# Patient Record
Sex: FEMALE | Race: WHITE | NOT HISPANIC OR LATINO | Employment: FULL TIME | URBAN - NONMETROPOLITAN AREA
[De-identification: names, ages, dates, MRNs, and addresses within clinical notes are randomized per-mention and may not be internally consistent; named-entity substitution may affect disease eponyms.]

---

## 2017-07-21 ENCOUNTER — OFFICE VISIT (OUTPATIENT)
Dept: SURGERY | Facility: CLINIC | Age: 57
End: 2017-07-21

## 2017-07-21 VITALS
WEIGHT: 225 LBS | SYSTOLIC BLOOD PRESSURE: 130 MMHG | OXYGEN SATURATION: 98 % | TEMPERATURE: 97.6 F | BODY MASS INDEX: 39.87 KG/M2 | HEART RATE: 86 BPM | DIASTOLIC BLOOD PRESSURE: 78 MMHG | HEIGHT: 63 IN | RESPIRATION RATE: 16 BRPM

## 2017-07-21 DIAGNOSIS — K62.5 RECTAL BLEED: ICD-10-CM

## 2017-07-21 DIAGNOSIS — K60.3 ANAL FISTULA: Primary | ICD-10-CM

## 2017-07-21 PROCEDURE — 99204 OFFICE O/P NEW MOD 45 MIN: CPT | Performed by: SURGERY

## 2017-07-21 RX ORDER — METOPROLOL SUCCINATE 25 MG/1
50 TABLET, EXTENDED RELEASE ORAL DAILY
Refills: 1 | COMMUNITY
Start: 2017-06-29

## 2017-07-21 RX ORDER — MELOXICAM 15 MG/1
15 TABLET ORAL DAILY
Refills: 1 | COMMUNITY
Start: 2017-06-29

## 2017-07-21 RX ORDER — CIPROFLOXACIN 500 MG/1
TABLET, FILM COATED ORAL
Refills: 0 | COMMUNITY
Start: 2017-07-19 | End: 2017-08-14

## 2017-07-21 RX ORDER — SULFAMETHOXAZOLE AND TRIMETHOPRIM 800; 160 MG/1; MG/1
TABLET ORAL
Refills: 0 | COMMUNITY
Start: 2017-07-19 | End: 2017-08-14

## 2017-07-21 RX ORDER — GABAPENTIN 100 MG/1
100 CAPSULE ORAL 3 TIMES DAILY
Refills: 0 | COMMUNITY
Start: 2017-07-17

## 2017-07-21 RX ORDER — LANSOPRAZOLE 30 MG/1
30 CAPSULE, DELAYED RELEASE ORAL 2 TIMES DAILY
Refills: 1 | COMMUNITY
Start: 2017-06-29

## 2017-07-21 RX ORDER — SUMATRIPTAN 100 MG/1
100 TABLET, FILM COATED ORAL DAILY PRN
Refills: 1 | COMMUNITY
Start: 2017-06-29

## 2017-07-21 RX ORDER — DULOXETIN HYDROCHLORIDE 60 MG/1
60 CAPSULE, DELAYED RELEASE ORAL DAILY
Refills: 1 | COMMUNITY
Start: 2017-06-29

## 2017-07-21 NOTE — PROGRESS NOTES
Patient: Angelica Stewart    YOB: 1960    Date: 07/21/2017    Primary Care Provider: Tacho Mendoza MD    Reason for Consultation: abscess    Chief complaint:   Chief Complaint   Patient presents with   • Abscess     buttocks       Subjective .     History of present illness:  Patient presents today with abscess on her right buttocks for 2 months. She states that it constantly changes in size and Sunday night it busted on its own.She was seen by her PCP and a culture was taken due to the area voluntarily draining. She was placed on Cipro and Bactrim that she has at home but has not started. Patient had a family history of anal fistulas and has not had a previous colonoscopy.  She also has intermittent rectal bleeding which is concerning.  No weight loss or anemia.    Review of Systems   Constitutional: Negative for chills, fever and unexpected weight change.   HENT: Negative for hearing loss, trouble swallowing and voice change.    Eyes: Negative for visual disturbance.   Respiratory: Negative for apnea, cough, chest tightness, shortness of breath and wheezing.    Cardiovascular: Negative for chest pain, palpitations and leg swelling.   Gastrointestinal: Negative for abdominal distention, abdominal pain, anal bleeding, blood in stool, constipation, diarrhea, nausea, rectal pain and vomiting.   Endocrine: Negative for cold intolerance and heat intolerance.   Genitourinary: Negative for difficulty urinating, dysuria and flank pain.   Musculoskeletal: Negative for back pain and gait problem.   Skin: Positive for wound. Negative for color change and rash.   Neurological: Negative for dizziness, syncope, speech difficulty, weakness, light-headedness, numbness and headaches.   Hematological: Negative for adenopathy. Does not bruise/bleed easily.   Psychiatric/Behavioral: Negative for confusion. The patient is not nervous/anxious.        Allergies:  Allergies   Allergen Reactions   • Penicillins  "       Medications:    Current Outpatient Prescriptions:   •  DULoxetine (CYMBALTA) 60 MG capsule, , Disp: , Rfl: 1  •  gabapentin (NEURONTIN) 100 MG capsule, , Disp: , Rfl: 0  •  lansoprazole (PREVACID) 30 MG capsule, , Disp: , Rfl: 1  •  linaclotide (LINZESS) 72 MCG capsule capsule, Take 72 mcg by mouth Every Morning Before Breakfast., Disp: , Rfl:   •  meloxicam (MOBIC) 15 MG tablet, , Disp: , Rfl: 1  •  metoprolol succinate XL (TOPROL-XL) 25 MG 24 hr tablet, , Disp: , Rfl: 1  •  SUMAtriptan (IMITREX) 100 MG tablet, , Disp: , Rfl: 1  •  ciprofloxacin (CIPRO) 500 MG tablet, TAKE 1 TABLET BY MOUTH TWICE DAILY, Disp: , Rfl: 0  •  sulfamethoxazole-trimethoprim (BACTRIM DS,SEPTRA DS) 800-160 MG per tablet, TAKE 2 TABLETS BY MOUTH TWICE DAILY FOR 10 DAYS, Disp: , Rfl: 0    History\"  Past Medical History:   Diagnosis Date   • Acid reflux    • Fibromyalgia    • Migraines        Past Surgical History:   Procedure Laterality Date   • TOOTH EXTRACTION         Family History   Problem Relation Age of Onset   • Cancer Father      colon       Social History   Substance Use Topics   • Smoking status: Never Smoker   • Smokeless tobacco: Never Used   • Alcohol use No        Objective     Vital Signs:   /78  Pulse 86  Temp 97.6 °F (36.4 °C) (Temporal Artery )   Resp 16  Ht 63\" (160 cm)  Wt 225 lb (102 kg)  SpO2 98%  BMI 39.86 kg/m2    Physical Exam:   General Appearance:    Alert, cooperative, in no acute distress   Head:    Normocephalic, without obvious abnormality, atraumatic   Eyes:            Lids and lashes normal, conjunctivae and sclerae normal, no   icterus, no pallor, corneas clear, PERRLA   Ears:    Ears appear intact with no abnormalities noted   Throat:   No oral lesions, no thrush, oral mucosa moist   Neck:   No adenopathy, supple, trachea midline, no thyromegaly, no   carotid bruit, no JVD   Lungs:     Clear to auscultation,respirations regular, even and                  unlabored    Heart:    Regular " rhythm and normal rate, normal S1 and S2, no            murmur, no gallop, no rub, no click   Chest Wall:    No abnormalities observed   Abdomen:     Normal bowel sounds, no masses, no organomegaly, soft        non-tender, non-distended, no guarding, no rebound                tenderness   Extremities:   Moves all extremities well, no edema, no cyanosis, no             redness   Pulses:   Pulses palpable and equal bilaterally   Skin:   No bleeding, bruising or rash   Lymph nodes:   No palpable adenopathy   Neurologic:   Cranial nerves 2 - 12 grossly intact, sensation intact, DTR       present and equal bilaterally  Rectal-anal fistula at the 2 o'clock position.       Results Review:   I reviewed the patient's new clinical results.    Assessment/Plan     1. Anal fistula    2. Rectal bleed        Patient scheduled for colonoscopy and excision of anal fistula.  Risk of bleeding, infection and recurrence discussed and patient agreeable.  Also told that wound remained open distally and she is agreeable.    I discussed the patients findings and my recommendations with patient    Electronically signed by Manisha Potter MD  07/21/17      .

## 2017-08-14 ENCOUNTER — OFFICE VISIT (OUTPATIENT)
Dept: SURGERY | Facility: CLINIC | Age: 57
End: 2017-08-14

## 2017-08-14 VITALS
WEIGHT: 225 LBS | HEART RATE: 82 BPM | DIASTOLIC BLOOD PRESSURE: 88 MMHG | TEMPERATURE: 96 F | RESPIRATION RATE: 16 BRPM | SYSTOLIC BLOOD PRESSURE: 132 MMHG | OXYGEN SATURATION: 97 % | HEIGHT: 63 IN | BODY MASS INDEX: 39.87 KG/M2

## 2017-08-14 DIAGNOSIS — K60.3 ANAL FISTULA: ICD-10-CM

## 2017-08-14 DIAGNOSIS — Z12.11 SCREENING FOR COLON CANCER: Primary | ICD-10-CM

## 2017-08-14 PROCEDURE — 99213 OFFICE O/P EST LOW 20 MIN: CPT | Performed by: SURGERY

## 2017-08-14 NOTE — PROGRESS NOTES
"Patient: Angelica Stewart    YOB: 1960    Date: 08/14/2017    Primary Care Provider: Tacho Mendoza MD    Reason for Consultation: anal fistula & rectocele    Chief Complaint:   Chief Complaint   Patient presents with   • Follow-up     anal fistula and rectocele       History: Patient was seen by Dr Ptoter on 07/21/2017 to be scheduled for a colonoscopy and excision anal fistula. She is here today to discuss this procedure that she has not yet had. She is also c/o having a rectocele that she needs checked. She was seen by Dr Pugh for this in the past but has not been back to see him since he moved. Patient feels anal fistula completely healed.  No residual tractors left.  She comes in for examination.    Review of Systems   Constitutional: Negative for chills, fever and unexpected weight change.   HENT: Negative for hearing loss, trouble swallowing and voice change.    Eyes: Negative for visual disturbance.   Respiratory: Negative for apnea, cough, chest tightness, shortness of breath and wheezing.    Cardiovascular: Negative for chest pain, palpitations and leg swelling.   Gastrointestinal: Negative for abdominal distention, abdominal pain, anal bleeding, blood in stool, constipation, diarrhea, nausea, rectal pain and vomiting.   Endocrine: Negative for cold intolerance and heat intolerance.   Genitourinary: Negative for difficulty urinating, dysuria and flank pain.   Musculoskeletal: Negative for back pain and gait problem.   Skin: Negative for color change, rash and wound.   Neurological: Negative for dizziness, syncope, speech difficulty, weakness, light-headedness, numbness and headaches.   Hematological: Negative for adenopathy. Does not bruise/bleed easily.   Psychiatric/Behavioral: Negative for confusion. The patient is not nervous/anxious.        Vital Signs  /88  Pulse 82  Temp 96 °F (35.6 °C) (Temporal Artery )   Resp 16  Ht 63\" (160 cm)  Wt 225 lb (102 kg)  SpO2 " 97%  BMI 39.86 kg/m2    Allergies:  Allergies   Allergen Reactions   • Penicillins        Medications:    Current Outpatient Prescriptions:   •  DULoxetine (CYMBALTA) 60 MG capsule, , Disp: , Rfl: 1  •  gabapentin (NEURONTIN) 100 MG capsule, , Disp: , Rfl: 0  •  lansoprazole (PREVACID) 30 MG capsule, , Disp: , Rfl: 1  •  linaclotide (LINZESS) 72 MCG capsule capsule, Take 72 mcg by mouth Every Morning Before Breakfast., Disp: , Rfl:   •  meloxicam (MOBIC) 15 MG tablet, , Disp: , Rfl: 1  •  metoprolol succinate XL (TOPROL-XL) 25 MG 24 hr tablet, , Disp: , Rfl: 1  •  SUMAtriptan (IMITREX) 100 MG tablet, , Disp: , Rfl: 1    Physical Exam:   General Appearance:    Alert, cooperative, in no acute distress   Head:    Normocephalic, without obvious abnormality, atraumatic   Lungs:     Clear to auscultation,respirations regular, even and                  unlabored    Heart:    Regular rhythm and normal rate, normal S1 and S2, no            murmur, no gallop, no rub, no click   Abdomen:     Normal bowel sounds, no masses, no organomegaly, soft        non-tender, non-distended, no guarding, no rebound                tenderness   Extremities:   Moves all extremities well, no edema, no cyanosis, no             redness   Pulses:   Pulses palpable and equal bilaterally   Skin:   No bleeding, bruising or rash      Assessment/Plan     1. Screening for colon cancer    2. Anal fistula      It appeared the anal fistula has resolved.  Very small residual not is present.  Patient would like to schedule colonoscopy but hold off on excision of anal fistula.  She understands this could really occur and if it does she will be agreeable to excision.  She understands about the colonoscopy and risks of bleeding perforation and is agreeable.    Electronically signed by Manisha Potter MD  08/14/17  Scribed for Manisha Potter MD by Kat Morrow. 8/14/2017  3:52 PM

## 2017-09-25 ENCOUNTER — HOSPITAL ENCOUNTER (OUTPATIENT)
Facility: HOSPITAL | Age: 57
Setting detail: HOSPITAL OUTPATIENT SURGERY
Discharge: HOME | End: 2017-09-25
Attending: SURGERY | Admitting: SURGERY

## 2017-09-25 ENCOUNTER — ANESTHESIA (OUTPATIENT)
Dept: GASTROENTEROLOGY | Facility: HOSPITAL | Age: 57
End: 2017-09-25

## 2017-09-25 ENCOUNTER — ANESTHESIA EVENT (OUTPATIENT)
Dept: GASTROENTEROLOGY | Facility: HOSPITAL | Age: 57
End: 2017-09-25

## 2017-09-25 VITALS
TEMPERATURE: 98.2 F | BODY MASS INDEX: 38.98 KG/M2 | WEIGHT: 220 LBS | DIASTOLIC BLOOD PRESSURE: 90 MMHG | HEART RATE: 78 BPM | OXYGEN SATURATION: 96 % | HEIGHT: 63 IN | RESPIRATION RATE: 18 BRPM | SYSTOLIC BLOOD PRESSURE: 181 MMHG

## 2017-09-25 PROCEDURE — G0121 COLON CA SCRN NOT HI RSK IND: HCPCS | Performed by: SURGERY

## 2017-09-25 PROCEDURE — S0260 H&P FOR SURGERY: HCPCS | Performed by: SURGERY

## 2017-09-25 PROCEDURE — 25010000002 PROPOFOL 200 MG/20ML EMULSION: Performed by: NURSE ANESTHETIST, CERTIFIED REGISTERED

## 2017-09-25 PROCEDURE — 25010000002 ONDANSETRON PER 1 MG: Performed by: NURSE ANESTHETIST, CERTIFIED REGISTERED

## 2017-09-25 RX ORDER — SODIUM CHLORIDE, SODIUM LACTATE, POTASSIUM CHLORIDE, CALCIUM CHLORIDE 600; 310; 30; 20 MG/100ML; MG/100ML; MG/100ML; MG/100ML
1000 INJECTION, SOLUTION INTRAVENOUS CONTINUOUS PRN
Status: DISCONTINUED | OUTPATIENT
Start: 2017-09-25 | End: 2017-09-25 | Stop reason: HOSPADM

## 2017-09-25 RX ORDER — ONDANSETRON 2 MG/ML
4 INJECTION INTRAMUSCULAR; INTRAVENOUS ONCE AS NEEDED
Status: DISCONTINUED | OUTPATIENT
Start: 2017-09-25 | End: 2017-09-25 | Stop reason: HOSPADM

## 2017-09-25 RX ORDER — PROPOFOL 10 MG/ML
INJECTION, EMULSION INTRAVENOUS AS NEEDED
Status: DISCONTINUED | OUTPATIENT
Start: 2017-09-25 | End: 2017-09-25 | Stop reason: SURG

## 2017-09-25 RX ORDER — SODIUM CHLORIDE 0.9 % (FLUSH) 0.9 %
3 SYRINGE (ML) INJECTION AS NEEDED
Status: DISCONTINUED | OUTPATIENT
Start: 2017-09-25 | End: 2017-09-25 | Stop reason: HOSPADM

## 2017-09-25 RX ORDER — ONDANSETRON 2 MG/ML
INJECTION INTRAMUSCULAR; INTRAVENOUS AS NEEDED
Status: DISCONTINUED | OUTPATIENT
Start: 2017-09-25 | End: 2017-09-25 | Stop reason: SURG

## 2017-09-25 RX ADMIN — ONDANSETRON 4 MG: 2 INJECTION INTRAMUSCULAR; INTRAVENOUS at 09:12

## 2017-09-25 RX ADMIN — PROPOFOL 100 MG: 10 INJECTION, EMULSION INTRAVENOUS at 09:17

## 2017-09-25 RX ADMIN — LIDOCAINE HYDROCHLORIDE 60 MG: 20 INJECTION, SOLUTION INTRAVENOUS at 09:12

## 2017-09-25 RX ADMIN — PROPOFOL 50 MG: 10 INJECTION, EMULSION INTRAVENOUS at 09:27

## 2017-09-25 RX ADMIN — SODIUM CHLORIDE, POTASSIUM CHLORIDE, SODIUM LACTATE AND CALCIUM CHLORIDE 1000 ML: 600; 310; 30; 20 INJECTION, SOLUTION INTRAVENOUS at 07:58

## 2017-09-25 RX ADMIN — PROPOFOL 50 MG: 10 INJECTION, EMULSION INTRAVENOUS at 09:19

## 2017-09-25 RX ADMIN — PROPOFOL 50 MG: 10 INJECTION, EMULSION INTRAVENOUS at 09:24

## 2017-09-25 NOTE — DISCHARGE INSTRUCTIONS
Please follow all post op instructions and follow up appointment time from your physician's office included in your discharge packet.  .   No pushing, pulling, tugging,  heavy lifting, or strenuous activity.  No major decision making, driving, or drinking alcoholic beverages for 24 hours. ( due to the medications you have  received)  Always use good hand hygiene/washing techniques.  NO driving while taking pain medications.To assist you in voiding:  Drink plenty of fluids  Listen to running water while attempting to void.    If you are unable to urinate and you have an uncomfortable urge to void or it has been   6 hours since you were discharged, return to the Emergency Room  Rest today  No pushing,pulling,tugging,heavy lifting, or strenuous activity   No major decision making,driving,or drinking alcoholic beverages for 24 hours due to the sedation you received  Always use good hand hygiene/washing technique  No driving on pain medications

## 2017-09-25 NOTE — OP NOTE
PATIENT:    Angelica Stewart    DATE OF SURGERY:  9/25/2017    PHYSICIAN:    Manisha Potter MD ,FACS    REFERRING PHYSICIAN:  Manisha Potter MD    YOB: 1960    PREOPERATIVE DIAGNOSIS:   Screening    POSTOPERATIVE DIAGNOSIS:  Normal colonoscopy    PROCEDURE:  Colonoscopy    HISTORY:   The patient was sent to me as a consultation via Manisha Potter MD for evaluation and treatment of the above-mentioned symptomatology.  The patient is here now today for elective colonoscopy with biopsy.  I did meet with the patient preoperatively who understands the full risks and benefits of the above-mentioned procedure.  We will proceed with this today on an elective basis.      ANESTHESIA:  The patient was monitored both preoperatively and postoperatively in the normal fashion from a cardiovascular standpoint.  Oxygen was delivered at 2 liters per nasal cannula, and oxygen saturations were monitored during the procedure.   The patient remained stable from a cardiovascular standpoint throughout the entire procedure.      OPERATIVE PROCEDURE:  The patient was taken to the endoscopy unit and placed in the supine position and given anesthesia as mentioned above.  The patient was then placed in the left lateral decubitus position and the scope was introduced into the patient’s anus and then into the rectum without difficulty.  The scope was carefully advanced throughout the colon to the ileocecal valve.  All mucosal surfaces were visualized.      Upon careful withdrawal of the scope, there was noted to be no evidence of hemorrhoids, colon polyps or diverticulosis..      A retroflexed view was obtained of the patient’s rectum and this showed no evidence of abnormalities.  Digital rectal examination was performed and revealed good sphincter tone and no obvious masses.     The patient was stable at this point in time and subsequently transferred back to the recovery room in stable condition.    QUALITY OF PREP:   Poor    PLAN:  I want to see the patient back in 10 years for followup colonoscopy

## 2017-09-25 NOTE — ANESTHESIA POSTPROCEDURE EVALUATION
"Patient: Angelica Stewart    Procedure Summary     Date Anesthesia Start Anesthesia Stop Room / Location    09/25/17 0912 0930 [facility] ENDOSCOPY 3 / [facility] ENDOSCOPY       Procedure Diagnosis Surgeon Provider    COLONOSCOPY (N/A ) Anal fistula; Screening for colon cancer  (Anal fistula [K60.3]; Screening for colon cancer [Z12.11]) MD Michael Mir CRNA          Anesthesia Type: MAC  Last vitals BP (!) 181/90 (BP Location: Left arm, Patient Position: Sitting)  Pulse 78  Temp 98.2 °F (36.8 °C) (Temporal Artery )   Resp 18  Ht 63\" (160 cm)  Wt 220 lb (99.8 kg)  LMP  (LMP Unknown) Comment:  FOR 7YRS  SpO2 96%  BMI 38.97 kg/m2    BP       Temp        Pulse       Resp        SpO2          Post Anesthesia Care and Evaluation    Patient location during evaluation: bedside  Patient participation: complete - patient participated  Level of consciousness: awake  Pain score: 0  Pain management: adequate  Airway patency: patent  Anesthetic complications: No anesthetic complications  PONV Status: controlled  Cardiovascular status: acceptable and stable  Respiratory status: acceptable and room air  Hydration status: acceptable      "

## 2017-09-25 NOTE — PLAN OF CARE
Problem: GI Endoscopy (Adult)  Goal: Signs and Symptoms of Listed Potential Problems Will be Absent or Manageable (GI Endoscopy)  Outcome: Ongoing (interventions implemented as appropriate)    09/25/17 4262   GI Endoscopy   Problems Assessed (GI Endoscopy) all   Problems Present (GI Endoscopy) situational response

## 2017-09-25 NOTE — ANESTHESIA PREPROCEDURE EVALUATION
Anesthesia Evaluation     Patient summary reviewed and Nursing notes reviewed   history of anesthetic complications: PONV  NPO Solid Status: > 8 hours  NPO Liquid Status: > 8 hours     Airway   Mallampati: II  Neck ROM: full  no difficulty expected  Dental - normal exam     Pulmonary - negative pulmonary ROS and normal exam    breath sounds clear to auscultation  Cardiovascular - normal exam  Exercise tolerance: good (4-7 METS)    Patient on routine beta blocker and Beta blocker given within 24 hours of surgery  Rhythm: regular  Rate: normal    (+) hypertension well controlled,       Neuro/Psych  (+) headaches,    GI/Hepatic/Renal/Endo    (+) obesity,      Musculoskeletal     (+) myalgias,   Abdominal    Substance History - negative use     OB/GYN negative ob/gyn ROS         Other - negative ROS                                       Anesthesia Plan    ASA 3     MAC     Anesthetic plan and risks discussed with patient.

## 2017-09-25 NOTE — H&P
"    [facility]   HISTORY AND PHYSICAL      Name:  Angelica Stewart   Age:  56 y.o.  Sex:  female  :  1960  MRN:  [id number]   Visit Number:  [id number]  Admission Date:  2017  Date Of Service:  17  Primary Care Physician:  Tacho Mendoza MD    Chief Complaint:     Anal fistula, colon screening    History Of Presenting Illness:      Patient here for colonoscopy, had an abscess and anal fistula has resolved.  No family history of colon cancer no rectal bleeding    Review Of Systems:     General ROS: Patient denies any fevers, chills or loss of consciousness.  No complaints of generalized weakness  Psychological ROS: Denies any hallucinations and delusions.  Ophthalmic ROS: no transient loss of vision.  ENT ROS: Denies sore throat, nasal congestion or ear pain.   Allergy and Immunology ROS: Denies rash or itching.  Hematological and Lymphatic ROS: Denies neck swelling or easy bleeding.  Endocrine ROS: Denies any recent unintentional weight gain or loss.  Breast ROS: Denies any pain or swelling.  Respiratory ROS: Denies cough or shortness of breath.   Cardiovascular ROS: Denies chest pain or palpitations. No history of exertional chest pain.   Gastrointestinal ROS: Denies nausea and vomiting. Denies any abdominal pain. No diarrhea.   Genito-Urinary ROS: Denies dysuria or hematuria.  Musculoskeletal ROS: no back pain. No muscle pain. No calf pain.   Neurological ROS: Denies any focal weakness. No loss of consciousness. Denies any numbness.   Dermatological ROS: Denies any redness or pruritis.     Past Medical History:    Past Medical History:   Diagnosis Date   • Acid reflux    • Anal fistula    • Body piercing     EARS   • Fibromyalgia     PATIENT REPORTS \"IT'S SELF DIAGNOSED\"   • Full dentures     INSTRUCTED NO ADHESIVES DOS   • Hypertension    • Migraines    • Migraines    • PONV (postoperative nausea and vomiting)    • Rectocele    • Wears contact lenses  "       Past Surgical history:    Past Surgical History:   Procedure Laterality Date   • BREAST SURGERY Bilateral     REPORTS BILATERAL BIOPSIES WITH TUMOR MARKER PLACEMENT   • ENDOSCOPY     • MOUTH SURGERY      REPORTS FULL MOUTH EXTRACTION AT AGE 14   • OTHER SURGICAL HISTORY  2007    REPORTS CYSTS REMOVED FROM THROAT       Social History:    Social History     Social History   • Marital status:      Spouse name: N/A   • Number of children: N/A   • Years of education: N/A     Occupational History   • Not on file.     Social History Main Topics   • Smoking status: Never Smoker   • Smokeless tobacco: Never Used   • Alcohol use No   • Drug use: No   • Sexual activity: Defer     Other Topics Concern   • Not on file     Social History Narrative       Family History:    Family History   Problem Relation Age of Onset   • Cancer Father      colon       Allergies:      Penicillins    Home Medications:    Prior to Admission Medications     Prescriptions Last Dose Informant Patient Reported? Taking?    DULoxetine (CYMBALTA) 60 MG capsule 9/24/2017 Self Yes Yes    Take 60 mg by mouth Daily.    gabapentin (NEURONTIN) 100 MG capsule 9/24/2017 Self Yes Yes    Take 100 mg by mouth 2 (Two) Times a Day.    lansoprazole (PREVACID) 30 MG capsule 9/24/2017 Self Yes Yes    Take 30 mg by mouth 2 (Two) Times a Day.    linaclotide (LINZESS) 72 MCG capsule capsule 9/24/2017 Self Yes Yes    Take 72 mcg by mouth Every Night.    meloxicam (MOBIC) 15 MG tablet 9/24/2017 Self Yes Yes    Take 15 mg by mouth Daily.    metoprolol succinate XL (TOPROL-XL) 25 MG 24 hr tablet 9/24/2017 Self Yes Yes    Take 25 mg by mouth Daily.    SUMAtriptan (IMITREX) 100 MG tablet More than a month Self Yes No    Take 100 mg by mouth Daily As Needed for Migraine.             ED Medications:    Medications   sodium chloride 0.9 % flush 3 mL (not administered)   lactated ringers infusion 1,000 mL (1,000 mL Intravenous New Bag 9/25/17 6348)       Vital  Signs:    Temp:  [96.8 °F (36 °C)] 96.8 °F (36 °C)  Heart Rate:  [74] 74  Resp:  [18] 18  BP: (158)/(80) 158/80    Last 3 weights    09/20/17  1136   Weight: 220 lb (99.8 kg)       Body mass index is 38.97 kg/(m^2).    Physical Exam:      General Appearance:  Alert and cooperative, not in any acute distress.   Head:  Atraumatic and normocephalic, without obvious abnormality.   Eyes:          PERRLA, conjunctivae and sclerae normal, no Icterus. No pallor. Extra-occular movements are within normal limits.   Ears:  Ears appear intact with no abnormalities noted.   Throat: No oral lesions, no thrush, oral mucosa moist.   Neck: Supple, trachea midline, no thyromegaly, no carotid bruit.       Respiratory/Lungs:   Breath sounds heard bilaterally equally.  No crackles or wheezing. No Pleural rub or bronchial breathing. Normal respiratory effort.    Cardiovascular/Heart:  Normal S1 and S2, no murmur. No edema   GI/Abdomen:   No masses, no hepatosplenomegaly. Soft, non-tender, non-distended, no hernia                 Musculoskeletal/ Extremities:   Moves all extremities well   Pulses: Pulses palpable and equal bilaterally   Skin: No bleeding, bruising or rash, no induration   Lymph nodes: No palpable adenopathy   Psychiatric : Alert and oriented ×3.  No depression or anxiety            EKG:      None    Labs:    Lab Results (last 24 hours)     ** No results found for the last 24 hours. **          Radiology:    Imaging Results (last 72 hours)     ** No results found for the last 72 hours. **          Assessment:    Colon screening, anal fistula     Plan:     Colonoscopy, risks of bleeding perforation discussed and patient agreeable    Manisha Potter MD  09/25/17  9:16 AM

## 2018-05-09 ENCOUNTER — TRANSCRIBE ORDERS (OUTPATIENT)
Dept: ADMINISTRATIVE | Facility: HOSPITAL | Age: 58
End: 2018-05-09

## 2018-05-09 DIAGNOSIS — Z12.31 VISIT FOR SCREENING MAMMOGRAM: Primary | ICD-10-CM

## 2018-05-23 ENCOUNTER — HOSPITAL ENCOUNTER (OUTPATIENT)
Dept: MAMMOGRAPHY | Facility: HOSPITAL | Age: 58
Discharge: HOME | End: 2018-05-23
Attending: OBSTETRICS & GYNECOLOGY | Admitting: INTERNAL MEDICINE

## 2018-05-23 DIAGNOSIS — Z12.31 VISIT FOR SCREENING MAMMOGRAM: ICD-10-CM

## 2018-05-23 PROCEDURE — 77067 SCR MAMMO BI INCL CAD: CPT

## 2018-05-23 PROCEDURE — 77063 BREAST TOMOSYNTHESIS BI: CPT | Performed by: RADIOLOGY

## 2018-05-23 PROCEDURE — 77063 BREAST TOMOSYNTHESIS BI: CPT

## 2018-05-23 PROCEDURE — 77067 SCR MAMMO BI INCL CAD: CPT | Performed by: RADIOLOGY

## 2019-01-07 ENCOUNTER — OFFICE VISIT (OUTPATIENT)
Dept: ORTHOPEDIC SURGERY | Facility: CLINIC | Age: 59
End: 2019-01-07

## 2019-01-07 ENCOUNTER — APPOINTMENT (OUTPATIENT)
Dept: PREADMISSION TESTING | Facility: HOSPITAL | Age: 59
End: 2019-01-07

## 2019-01-07 VITALS — WEIGHT: 220 LBS | HEIGHT: 63 IN | HEART RATE: 86 BPM | OXYGEN SATURATION: 96 % | BODY MASS INDEX: 38.98 KG/M2

## 2019-01-07 VITALS — BODY MASS INDEX: 38.98 KG/M2 | WEIGHT: 220 LBS | HEIGHT: 63 IN

## 2019-01-07 DIAGNOSIS — M79.7 FIBROMYALGIA: ICD-10-CM

## 2019-01-07 DIAGNOSIS — S82.842A ANKLE FRACTURE, BIMALLEOLAR, CLOSED, LEFT, INITIAL ENCOUNTER: ICD-10-CM

## 2019-01-07 DIAGNOSIS — S82.842A ANKLE FRACTURE, BIMALLEOLAR, CLOSED, LEFT, INITIAL ENCOUNTER: Primary | ICD-10-CM

## 2019-01-07 LAB
ANION GAP SERPL CALCULATED.3IONS-SCNC: 5 MMOL/L (ref 3–11)
BASOPHILS # BLD AUTO: 0.03 10*3/MM3 (ref 0–0.2)
BASOPHILS NFR BLD AUTO: 0.3 % (ref 0–1)
BUN BLD-MCNC: 12 MG/DL (ref 9–23)
BUN/CREAT SERPL: 20.3 (ref 7–25)
CALCIUM SPEC-SCNC: 9.4 MG/DL (ref 8.7–10.4)
CHLORIDE SERPL-SCNC: 105 MMOL/L (ref 99–109)
CO2 SERPL-SCNC: 28 MMOL/L (ref 20–31)
CREAT BLD-MCNC: 0.59 MG/DL (ref 0.6–1.3)
DEPRECATED RDW RBC AUTO: 44.6 FL (ref 37–54)
EOSINOPHIL # BLD AUTO: 0.15 10*3/MM3 (ref 0–0.3)
EOSINOPHIL NFR BLD AUTO: 1.3 % (ref 0–3)
ERYTHROCYTE [DISTWIDTH] IN BLOOD BY AUTOMATED COUNT: 13 % (ref 11.3–14.5)
GFR SERPL CREATININE-BSD FRML MDRD: 104 ML/MIN/1.73
GLUCOSE BLD-MCNC: 81 MG/DL (ref 70–100)
HBA1C MFR BLD: 7 % (ref 4.8–5.6)
HCT VFR BLD AUTO: 43.1 % (ref 34.5–44)
HGB BLD-MCNC: 13.6 G/DL (ref 11.5–15.5)
IMM GRANULOCYTES # BLD AUTO: 0.05 10*3/MM3 (ref 0–0.03)
IMM GRANULOCYTES NFR BLD AUTO: 0.4 % (ref 0–0.6)
LYMPHOCYTES # BLD AUTO: 2.95 10*3/MM3 (ref 0.6–4.8)
LYMPHOCYTES NFR BLD AUTO: 26.2 % (ref 24–44)
MCH RBC QN AUTO: 29.7 PG (ref 27–31)
MCHC RBC AUTO-ENTMCNC: 31.6 G/DL (ref 32–36)
MCV RBC AUTO: 94.1 FL (ref 80–99)
MONOCYTES # BLD AUTO: 0.84 10*3/MM3 (ref 0–1)
MONOCYTES NFR BLD AUTO: 7.5 % (ref 0–12)
NEUTROPHILS # BLD AUTO: 7.28 10*3/MM3 (ref 1.5–8.3)
NEUTROPHILS NFR BLD AUTO: 64.7 % (ref 41–71)
PLATELET # BLD AUTO: 316 10*3/MM3 (ref 150–450)
PMV BLD AUTO: 11.5 FL (ref 6–12)
POTASSIUM BLD-SCNC: 4.5 MMOL/L (ref 3.5–5.5)
RBC # BLD AUTO: 4.58 10*6/MM3 (ref 3.89–5.14)
SODIUM BLD-SCNC: 138 MMOL/L (ref 132–146)
WBC NRBC COR # BLD: 11.25 10*3/MM3 (ref 3.5–10.8)

## 2019-01-07 PROCEDURE — 83036 HEMOGLOBIN GLYCOSYLATED A1C: CPT | Performed by: ORTHOPAEDIC SURGERY

## 2019-01-07 PROCEDURE — 85025 COMPLETE CBC W/AUTO DIFF WBC: CPT | Performed by: ORTHOPAEDIC SURGERY

## 2019-01-07 PROCEDURE — 93005 ELECTROCARDIOGRAM TRACING: CPT

## 2019-01-07 PROCEDURE — 80048 BASIC METABOLIC PNL TOTAL CA: CPT | Performed by: ORTHOPAEDIC SURGERY

## 2019-01-07 PROCEDURE — 93010 ELECTROCARDIOGRAM REPORT: CPT | Performed by: INTERNAL MEDICINE

## 2019-01-07 PROCEDURE — 36415 COLL VENOUS BLD VENIPUNCTURE: CPT

## 2019-01-07 PROCEDURE — 29405 APPL SHORT LEG CAST: CPT | Performed by: ORTHOPAEDIC SURGERY

## 2019-01-07 PROCEDURE — 99204 OFFICE O/P NEW MOD 45 MIN: CPT | Performed by: ORTHOPAEDIC SURGERY

## 2019-01-07 RX ORDER — IBUPROFEN 800 MG/1
800 TABLET ORAL EVERY 8 HOURS PRN
COMMUNITY
End: 2019-01-16 | Stop reason: HOSPADM

## 2019-01-07 RX ORDER — OXYCODONE HYDROCHLORIDE AND ACETAMINOPHEN 5; 325 MG/1; MG/1
1 TABLET ORAL EVERY 4 HOURS PRN
COMMUNITY
End: 2019-04-10

## 2019-01-07 NOTE — PROGRESS NOTES
"NEW PATIENT    Patient: Angelica Stewart  : 1960    Primary Care Provider: Tacho Mendoza MD    Requesting Provider: As above    Pain and Edema of the Left Ankle      History    Chief Complaint: left ankle injury     History of Present Illness: this is an extremely pleasant 58year old woman here with her .  She fell getting out of a car on Saturday (19) and has a displaced left bimalleolar ankle fracture.  She was seen at an outside ER and splinted.  Dr Mendoza has sent her for evaluation.  She has X-rays on a disc.  She has been trying to elevate, but not always above her heart.  Medical history is significant for fibromyalgia.      Current Outpatient Medications on File Prior to Visit   Medication Sig Dispense Refill   • DULoxetine (CYMBALTA) 60 MG capsule Take 60 mg by mouth Daily.  1   • gabapentin (NEURONTIN) 100 MG capsule Take 100 mg by mouth 2 (Two) Times a Day.  0   • lansoprazole (PREVACID) 30 MG capsule Take 30 mg by mouth 2 (Two) Times a Day.  1   • linaclotide (LINZESS) 72 MCG capsule capsule Take 72 mcg by mouth Every Night.     • meloxicam (MOBIC) 15 MG tablet Take 15 mg by mouth Daily.  1   • metoprolol succinate XL (TOPROL-XL) 25 MG 24 hr tablet Take 25 mg by mouth Daily.  1   • SUMAtriptan (IMITREX) 100 MG tablet Take 100 mg by mouth Daily As Needed for Migraine.  1     No current facility-administered medications on file prior to visit.       Allergies   Allergen Reactions   • Penicillins Other (See Comments)     UNSURE REACTION, REPORTS WAS TOLD THIS WAS AN ALLERGY BY HER MOTHER.   • Celecoxib Rash      Past Medical History:   Diagnosis Date   • Acid reflux    • Anal fistula    • Body piercing     EARS   • Fibromyalgia     PATIENT REPORTS \"IT'S SELF DIAGNOSED\"   • Full dentures     INSTRUCTED NO ADHESIVES DOS   • Hypertension    • Migraines    • Migraines    • PONV (postoperative nausea and vomiting)    • Rectocele    • Wears contact lenses      Past Surgical " History:   Procedure Laterality Date   • BREAST BIOPSY     • BREAST SURGERY Bilateral     REPORTS BILATERAL BIOPSIES WITH TUMOR MARKER PLACEMENT   • COLONOSCOPY N/A 9/25/2017    Procedure: COLONOSCOPY;  Surgeon: Manisha Potter MD;  Location: [facility];  Service:    • ENDOSCOPY     • MOUTH SURGERY      REPORTS FULL MOUTH EXTRACTION AT AGE 14   • OTHER SURGICAL HISTORY  2007    REPORTS CYSTS REMOVED FROM THROAT     Family History   Problem Relation Age of Onset   • Breast cancer Mother 80   • Cancer Mother    • Collagen disease Mother    • Hypertension Mother    • Cancer Father         colon   • Heart attack Father    • Ovarian cancer Neg Hx       Social History     Socioeconomic History   • Marital status:      Spouse name: Not on file   • Number of children: Not on file   • Years of education: Not on file   • Highest education level: Not on file   Social Needs   • Financial resource strain: Not on file   • Food insecurity - worry: Not on file   • Food insecurity - inability: Not on file   • Transportation needs - medical: Not on file   • Transportation needs - non-medical: Not on file   Occupational History   • Not on file   Tobacco Use   • Smoking status: Never Smoker   • Smokeless tobacco: Never Used   Substance and Sexual Activity   • Alcohol use: No   • Drug use: No   • Sexual activity: Defer   Other Topics Concern   • Not on file   Social History Narrative   • Not on file        Review of Systems   Constitutional: Positive for activity change.   HENT: Negative.    Eyes: Negative.    Respiratory: Negative.    Cardiovascular: Negative.    Gastrointestinal: Negative.    Endocrine: Negative.    Genitourinary: Negative.    Musculoskeletal: Positive for arthralgias, joint swelling and myalgias.   Skin: Negative.    Allergic/Immunologic: Negative.    Neurological: Negative.    Hematological: Negative.    Psychiatric/Behavioral: Negative.        The following portions of the patient's history were  "reviewed and updated as appropriate: allergies, current medications, past family history, past medical history, past social history, past surgical history and problem list.    Physical Exam:   Pulse 86   Ht 160 cm (63\")   Wt 99.8 kg (220 lb)   LMP  (LMP Unknown) Comment:  FOR 7YRS  SpO2 96%   BMI 38.97 kg/m²   GENERAL: Body habitus: obese    Lower extremity edema: Right: trace; Leftt: 2+ pitting    Varicose veins:  Right: mild; Left: mild    Gait: in wheelchair     Mental Status:  awake and alert; oriented to person, place, and time    Voice:  clear  SKIN:  Normal and warm and dry    Hair Growth:  Right:normal; Left:  normal  NAILS: Toenails: normal  HEENT: Head: Normocephalic, atraumatic,  without obvious abnormality.  eye: normal external eye, no icterus  ears: normal external ears  nose: normal external nose  pharynx: dental hygiene adequate  PULM:  Repiratory effort normal  CV:  Dorsalis Pedis:  Right: 2+; Left:2+    Posterior Tibial: Right:2+; Left:2+    Capillary Refill:  Brisk  MSK:  Hand:right handed and moderate arthritis      Tibia:  Right:  non tender; Left:  non tender      Ankle:  Right: non tender, ROM  normal and motor function  normal; Left:  tender medial and lateral, significant swelling, skin intact, toes wiggle, not tender in midfoot, not tender in forefoot, no sign of compartment syndrome, all motors fire, sensation intact dorsal and plantar foot, not tender proximal fibula      Foot:  Right:  non tender; Left:  non tender      NEURO: Heel Walking:  Right:  unable to test; Left:  unable to test    Toe Walking:  Right:  unable to test; Left:  unable to test     Luray-Zandra 5.07 monofilament test: normal    Lower extremity sensation: intact     Reflexes:  Biceps:  Right:  not tested; Left:  not tested           Quads:  Right:  not tested; Left:  not tested           Ankle:  Right:  not tested; Left:  not tested      Calf Atrophy:none    Motor Function: all 5/5- some give-way weakness " left due to pain         Medical Decision Making    Data Review:   reviewed radiology images, reviewed radiology results and reviewed outside records    Assessment and Plan/ Diagnosis/Treatment options:   1. Ankle fracture, bimalleolar, closed, left, initial encounter  She has an unstable ankle fracture pattern.  I explained fractures of joints to the patient.  I explained that all joint fractures will develop some arthritis. The goal of treatment is to put the joint back in  place as anatomically as possible, and hold it there to heal. This helps to slow down the progression of post-traumatic arthritis.  We cannot eliminate it.  I explained that fractures have stable and unstable patterns.  I think this fracture is too unstable to hold in a cast, I think the risk of malunion and non union are too high if we use cast treatment.  (it would need to be a long leg cast)  I would recommend surgery.   I explained the surgery, the 23 hour admission.  I explained the incisions, the plate and screws.  I would assume the hardware is permanent, rarely it is bothersome and can be removed.  I explained the possibility of syndesmotic fixation , and the possible need to remove that (I will not know if this is necessary until we are in surgery).  If we do not need to fix the syndesmosis the post op regimen is 6 -8 weeks non wt bearing in a short leg cast then 6-8 weeks walking in a boot doing physical therapy.  If we do need to fix the syndesmosis it is 12 weeks non-wt bearing.    I explained how all ankle fractures swell for months to years, some permanently.   I explained the risks including but not limited todeath, infection, stroke, heart attacks, blood clots, neurovascular damage, stiffness, pain, arthritis, hardware failure, non union, malunion, amputation, etc.  Questions were asked and answered in detail.  She needs to elevate more over her heart.  She was placed in a better fiberglass short leg splint, non-wt bearing.   Plan for surgery 1/15/19 to allow swelling to decrease    - Case Request; Standing  - ceFAZolin (ANCEF) 2 g in Sodium chloride 0.9 % 100 mL IVPB; Infuse 2 g into a venous catheter 1 (One) Time.  - Basic metabolic panel; Future  - CBC and Differential; Future  - Hemoglobin A1c; Future  - ECG 12 Lead; Future  - Case Request    2. Fibromyalgia  She will continue to take gabapentin

## 2019-01-07 NOTE — DISCHARGE INSTRUCTIONS

## 2019-01-14 RX ORDER — ONDANSETRON 4 MG/1
4 TABLET, FILM COATED ORAL EVERY 6 HOURS PRN
Qty: 30 TABLET | Refills: 0 | Status: SHIPPED | OUTPATIENT
Start: 2019-01-14

## 2019-01-14 RX ORDER — HYDROCODONE BITARTRATE AND ACETAMINOPHEN 7.5; 325 MG/1; MG/1
1-2 TABLET ORAL EVERY 6 HOURS PRN
Qty: 60 TABLET | Refills: 0 | Status: SHIPPED | OUTPATIENT
Start: 2019-01-14 | End: 2019-04-10

## 2019-01-14 RX ORDER — OXYCODONE HYDROCHLORIDE AND ACETAMINOPHEN 5; 325 MG/1; MG/1
1-2 TABLET ORAL EVERY 6 HOURS PRN
Qty: 60 TABLET | Refills: 0 | Status: SHIPPED | OUTPATIENT
Start: 2019-01-14 | End: 2019-01-16 | Stop reason: HOSPADM

## 2019-01-15 ENCOUNTER — ANESTHESIA (OUTPATIENT)
Dept: PERIOP | Facility: HOSPITAL | Age: 59
End: 2019-01-15

## 2019-01-15 ENCOUNTER — APPOINTMENT (OUTPATIENT)
Dept: GENERAL RADIOLOGY | Facility: HOSPITAL | Age: 59
End: 2019-01-15
Attending: ORTHOPAEDIC SURGERY

## 2019-01-15 ENCOUNTER — HOSPITAL ENCOUNTER (OUTPATIENT)
Facility: HOSPITAL | Age: 59
Discharge: HOME | End: 2019-01-16
Attending: ORTHOPAEDIC SURGERY | Admitting: ORTHOPAEDIC SURGERY

## 2019-01-15 ENCOUNTER — ANESTHESIA EVENT (OUTPATIENT)
Dept: PERIOP | Facility: HOSPITAL | Age: 59
End: 2019-01-15

## 2019-01-15 DIAGNOSIS — S82.842A ANKLE FRACTURE, BIMALLEOLAR, CLOSED, LEFT, INITIAL ENCOUNTER: ICD-10-CM

## 2019-01-15 DIAGNOSIS — Z74.09 IMPAIRED FUNCTIONAL MOBILITY, BALANCE, GAIT, AND ENDURANCE: Primary | ICD-10-CM

## 2019-01-15 DIAGNOSIS — Z87.81 S/P ORIF (OPEN REDUCTION INTERNAL FIXATION) FRACTURE: ICD-10-CM

## 2019-01-15 DIAGNOSIS — Z98.890 S/P ORIF (OPEN REDUCTION INTERNAL FIXATION) FRACTURE: ICD-10-CM

## 2019-01-15 PROBLEM — E11.9 DM (DIABETES MELLITUS) (HCC): Status: ACTIVE | Noted: 2019-01-15

## 2019-01-15 PROBLEM — I10 HTN (HYPERTENSION): Status: ACTIVE | Noted: 2019-01-15

## 2019-01-15 LAB
GLUCOSE BLDC GLUCOMTR-MCNC: 101 MG/DL (ref 70–130)
GLUCOSE BLDC GLUCOMTR-MCNC: 134 MG/DL (ref 70–130)
GLUCOSE BLDC GLUCOMTR-MCNC: 166 MG/DL (ref 70–130)
GLUCOSE BLDC GLUCOMTR-MCNC: 207 MG/DL (ref 70–130)
POTASSIUM BLD-SCNC: 4.1 MMOL/L (ref 3.5–5.5)

## 2019-01-15 PROCEDURE — C1713 ANCHOR/SCREW BN/BN,TIS/BN: HCPCS | Performed by: ORTHOPAEDIC SURGERY

## 2019-01-15 PROCEDURE — 27829 TREAT LOWER LEG JOINT: CPT | Performed by: ORTHOPAEDIC SURGERY

## 2019-01-15 PROCEDURE — 25010000002 PROPOFOL 10 MG/ML EMULSION: Performed by: NURSE ANESTHETIST, CERTIFIED REGISTERED

## 2019-01-15 PROCEDURE — 25010000002 ROPIVACAINE PER 1 MG: Performed by: NURSE ANESTHETIST, CERTIFIED REGISTERED

## 2019-01-15 PROCEDURE — 25010000002 DEXAMETHASONE PER 1 MG: Performed by: NURSE ANESTHETIST, CERTIFIED REGISTERED

## 2019-01-15 PROCEDURE — 63710000001 INSULIN LISPRO (HUMAN) PER 5 UNITS: Performed by: INTERNAL MEDICINE

## 2019-01-15 PROCEDURE — 97161 PT EVAL LOW COMPLEX 20 MIN: CPT

## 2019-01-15 PROCEDURE — 27814 TREATMENT OF ANKLE FRACTURE: CPT | Performed by: ORTHOPAEDIC SURGERY

## 2019-01-15 PROCEDURE — 82962 GLUCOSE BLOOD TEST: CPT

## 2019-01-15 PROCEDURE — 25010000002 DEXAMETHASONE SODIUM PHOSPHATE 10 MG/ML SOLUTION: Performed by: NURSE ANESTHETIST, CERTIFIED REGISTERED

## 2019-01-15 PROCEDURE — C1769 GUIDE WIRE: HCPCS | Performed by: ORTHOPAEDIC SURGERY

## 2019-01-15 PROCEDURE — 97530 THERAPEUTIC ACTIVITIES: CPT

## 2019-01-15 PROCEDURE — 25010000003 CEFAZOLIN IN DEXTROSE 2-4 GM/100ML-% SOLUTION: Performed by: ORTHOPAEDIC SURGERY

## 2019-01-15 PROCEDURE — 84132 ASSAY OF SERUM POTASSIUM: CPT | Performed by: ORTHOPAEDIC SURGERY

## 2019-01-15 PROCEDURE — 25010000002 ONDANSETRON PER 1 MG: Performed by: NURSE ANESTHETIST, CERTIFIED REGISTERED

## 2019-01-15 PROCEDURE — 76000 FLUOROSCOPY <1 HR PHYS/QHP: CPT

## 2019-01-15 PROCEDURE — 25010000002 FENTANYL CITRATE (PF) 100 MCG/2ML SOLUTION: Performed by: NURSE ANESTHETIST, CERTIFIED REGISTERED

## 2019-01-15 PROCEDURE — 25010000002 MIDAZOLAM PER 1 MG: Performed by: NURSE ANESTHETIST, CERTIFIED REGISTERED

## 2019-01-15 PROCEDURE — 25010000002 BUPRENORPHINE PER 0.1 MG: Performed by: NURSE ANESTHETIST, CERTIFIED REGISTERED

## 2019-01-15 DEVICE — SCRW CORT S/TAP 3.5X14MM: Type: IMPLANTABLE DEVICE | Site: ANKLE | Status: FUNCTIONAL

## 2019-01-15 DEVICE — SCRW CANC FUL/THRD 4.0X14MM: Type: IMPLANTABLE DEVICE | Site: ANKLE | Status: FUNCTIONAL

## 2019-01-15 DEVICE — SCRW CANC FUL/THRD 4.0X16MM: Type: IMPLANTABLE DEVICE | Site: ANKLE | Status: FUNCTIONAL

## 2019-01-15 DEVICE — SCRW CORT S/TAP 3.5X46MM: Type: IMPLANTABLE DEVICE | Site: ANKLE | Status: FUNCTIONAL

## 2019-01-15 DEVICE — PLT TBG 1/3 LCP W COL 7HL 81MM: Type: IMPLANTABLE DEVICE | Site: ANKLE | Status: FUNCTIONAL

## 2019-01-15 DEVICE — SCRW CORT S/TAP 3.5X18MM: Type: IMPLANTABLE DEVICE | Site: ANKLE | Status: FUNCTIONAL

## 2019-01-15 DEVICE — SCRW CORT S/TAP 2.7X30MM: Type: IMPLANTABLE DEVICE | Site: ANKLE | Status: FUNCTIONAL

## 2019-01-15 RX ORDER — SODIUM CHLORIDE, SODIUM LACTATE, POTASSIUM CHLORIDE, CALCIUM CHLORIDE 600; 310; 30; 20 MG/100ML; MG/100ML; MG/100ML; MG/100ML
INJECTION, SOLUTION INTRAVENOUS CONTINUOUS PRN
Status: DISCONTINUED | OUTPATIENT
Start: 2019-01-15 | End: 2019-01-15 | Stop reason: SURG

## 2019-01-15 RX ORDER — HYDROCODONE BITARTRATE AND ACETAMINOPHEN 7.5; 325 MG/1; MG/1
1 TABLET ORAL EVERY 4 HOURS PRN
Status: DISCONTINUED | OUTPATIENT
Start: 2019-01-15 | End: 2019-01-16 | Stop reason: HOSPADM

## 2019-01-15 RX ORDER — BISACODYL 10 MG
10 SUPPOSITORY, RECTAL RECTAL DAILY PRN
Status: DISCONTINUED | OUTPATIENT
Start: 2019-01-15 | End: 2019-01-16 | Stop reason: HOSPADM

## 2019-01-15 RX ORDER — NALOXONE HCL 0.4 MG/ML
0.4 VIAL (ML) INJECTION
Status: DISCONTINUED | OUTPATIENT
Start: 2019-01-15 | End: 2019-01-16 | Stop reason: HOSPADM

## 2019-01-15 RX ORDER — DEXAMETHASONE SODIUM PHOSPHATE 10 MG/ML
INJECTION, SOLUTION INTRAMUSCULAR; INTRAVENOUS
Status: COMPLETED | OUTPATIENT
Start: 2019-01-15 | End: 2019-01-15

## 2019-01-15 RX ORDER — PROPOFOL 10 MG/ML
VIAL (ML) INTRAVENOUS AS NEEDED
Status: DISCONTINUED | OUTPATIENT
Start: 2019-01-15 | End: 2019-01-15 | Stop reason: SURG

## 2019-01-15 RX ORDER — SODIUM CHLORIDE AND POTASSIUM CHLORIDE 150; 450 MG/100ML; MG/100ML
75 INJECTION, SOLUTION INTRAVENOUS CONTINUOUS
Status: DISCONTINUED | OUTPATIENT
Start: 2019-01-15 | End: 2019-01-16 | Stop reason: HOSPADM

## 2019-01-15 RX ORDER — PANTOPRAZOLE SODIUM 40 MG/1
40 TABLET, DELAYED RELEASE ORAL 2 TIMES DAILY
Status: DISCONTINUED | OUTPATIENT
Start: 2019-01-15 | End: 2019-01-16 | Stop reason: HOSPADM

## 2019-01-15 RX ORDER — FENTANYL CITRATE 50 UG/ML
50 INJECTION, SOLUTION INTRAMUSCULAR; INTRAVENOUS
Status: DISCONTINUED | OUTPATIENT
Start: 2019-01-15 | End: 2019-01-15 | Stop reason: HOSPADM

## 2019-01-15 RX ORDER — SUMATRIPTAN 50 MG/1
100 TABLET, FILM COATED ORAL DAILY PRN
Status: DISCONTINUED | OUTPATIENT
Start: 2019-01-15 | End: 2019-01-16 | Stop reason: HOSPADM

## 2019-01-15 RX ORDER — OXYCODONE HYDROCHLORIDE AND ACETAMINOPHEN 5; 325 MG/1; MG/1
1 TABLET ORAL EVERY 4 HOURS PRN
Status: DISCONTINUED | OUTPATIENT
Start: 2019-01-15 | End: 2019-01-16 | Stop reason: HOSPADM

## 2019-01-15 RX ORDER — DEXTROSE MONOHYDRATE 25 G/50ML
25 INJECTION, SOLUTION INTRAVENOUS
Status: DISCONTINUED | OUTPATIENT
Start: 2019-01-15 | End: 2019-01-16 | Stop reason: HOSPADM

## 2019-01-15 RX ORDER — FENTANYL CITRATE 50 UG/ML
INJECTION, SOLUTION INTRAMUSCULAR; INTRAVENOUS
Status: COMPLETED | OUTPATIENT
Start: 2019-01-15 | End: 2019-01-15

## 2019-01-15 RX ORDER — NICOTINE POLACRILEX 4 MG
15 LOZENGE BUCCAL
Status: DISCONTINUED | OUTPATIENT
Start: 2019-01-15 | End: 2019-01-16 | Stop reason: HOSPADM

## 2019-01-15 RX ORDER — FAMOTIDINE 10 MG/ML
20 INJECTION, SOLUTION INTRAVENOUS ONCE
Status: CANCELLED | OUTPATIENT
Start: 2019-01-15 | End: 2019-01-15

## 2019-01-15 RX ORDER — DIPHENOXYLATE HYDROCHLORIDE AND ATROPINE SULFATE 2.5; .025 MG/1; MG/1
1 TABLET ORAL DAILY
Status: DISCONTINUED | OUTPATIENT
Start: 2019-01-15 | End: 2019-01-16 | Stop reason: HOSPADM

## 2019-01-15 RX ORDER — HYDROCODONE BITARTRATE AND ACETAMINOPHEN 7.5; 325 MG/1; MG/1
2 TABLET ORAL EVERY 4 HOURS PRN
Status: DISCONTINUED | OUTPATIENT
Start: 2019-01-15 | End: 2019-01-15

## 2019-01-15 RX ORDER — DEXAMETHASONE SODIUM PHOSPHATE 4 MG/ML
INJECTION, SOLUTION INTRA-ARTICULAR; INTRALESIONAL; INTRAMUSCULAR; INTRAVENOUS; SOFT TISSUE AS NEEDED
Status: DISCONTINUED | OUTPATIENT
Start: 2019-01-15 | End: 2019-01-15 | Stop reason: SURG

## 2019-01-15 RX ORDER — BUPIVACAINE HYDROCHLORIDE 2.5 MG/ML
INJECTION, SOLUTION EPIDURAL; INFILTRATION; INTRACAUDAL
Status: COMPLETED | OUTPATIENT
Start: 2019-01-15 | End: 2019-01-15

## 2019-01-15 RX ORDER — MUPIROCIN CALCIUM 20 MG/G
CREAM TOPICAL AS NEEDED
Status: DISCONTINUED | OUTPATIENT
Start: 2019-01-15 | End: 2019-01-15 | Stop reason: HOSPADM

## 2019-01-15 RX ORDER — GLYCOPYRROLATE 0.2 MG/ML
INJECTION INTRAMUSCULAR; INTRAVENOUS AS NEEDED
Status: DISCONTINUED | OUTPATIENT
Start: 2019-01-15 | End: 2019-01-15 | Stop reason: SURG

## 2019-01-15 RX ORDER — PANTOPRAZOLE SODIUM 40 MG/1
40 TABLET, DELAYED RELEASE ORAL
Status: DISCONTINUED | OUTPATIENT
Start: 2019-01-15 | End: 2019-01-15

## 2019-01-15 RX ORDER — SODIUM CHLORIDE, SODIUM LACTATE, POTASSIUM CHLORIDE, CALCIUM CHLORIDE 600; 310; 30; 20 MG/100ML; MG/100ML; MG/100ML; MG/100ML
9 INJECTION, SOLUTION INTRAVENOUS CONTINUOUS
Status: DISCONTINUED | OUTPATIENT
Start: 2019-01-15 | End: 2019-01-16 | Stop reason: HOSPADM

## 2019-01-15 RX ORDER — ONDANSETRON 2 MG/ML
INJECTION INTRAMUSCULAR; INTRAVENOUS AS NEEDED
Status: DISCONTINUED | OUTPATIENT
Start: 2019-01-15 | End: 2019-01-15 | Stop reason: SURG

## 2019-01-15 RX ORDER — CEFAZOLIN SODIUM 2 G/100ML
2 INJECTION, SOLUTION INTRAVENOUS EVERY 8 HOURS
Status: COMPLETED | OUTPATIENT
Start: 2019-01-15 | End: 2019-01-16

## 2019-01-15 RX ORDER — MIDAZOLAM HYDROCHLORIDE 1 MG/ML
INJECTION INTRAMUSCULAR; INTRAVENOUS
Status: COMPLETED | OUTPATIENT
Start: 2019-01-15 | End: 2019-01-15

## 2019-01-15 RX ORDER — OXYCODONE HYDROCHLORIDE AND ACETAMINOPHEN 5; 325 MG/1; MG/1
2 TABLET ORAL EVERY 4 HOURS PRN
Status: DISCONTINUED | OUTPATIENT
Start: 2019-01-15 | End: 2019-01-16 | Stop reason: HOSPADM

## 2019-01-15 RX ORDER — BUPRENORPHINE HYDROCHLORIDE 0.32 MG/ML
INJECTION INTRAMUSCULAR; INTRAVENOUS
Status: COMPLETED | OUTPATIENT
Start: 2019-01-15 | End: 2019-01-15

## 2019-01-15 RX ORDER — ONDANSETRON 2 MG/ML
4 INJECTION INTRAMUSCULAR; INTRAVENOUS EVERY 6 HOURS PRN
Status: DISCONTINUED | OUTPATIENT
Start: 2019-01-15 | End: 2019-01-16 | Stop reason: HOSPADM

## 2019-01-15 RX ORDER — METOPROLOL SUCCINATE 50 MG/1
50 TABLET, EXTENDED RELEASE ORAL DAILY
Status: DISCONTINUED | OUTPATIENT
Start: 2019-01-15 | End: 2019-01-16 | Stop reason: HOSPADM

## 2019-01-15 RX ORDER — PROPOFOL 10 MG/ML
VIAL (ML) INTRAVENOUS CONTINUOUS PRN
Status: DISCONTINUED | OUTPATIENT
Start: 2019-01-15 | End: 2019-01-15 | Stop reason: SURG

## 2019-01-15 RX ORDER — SODIUM CHLORIDE 0.9 % (FLUSH) 0.9 %
3-10 SYRINGE (ML) INJECTION AS NEEDED
Status: DISCONTINUED | OUTPATIENT
Start: 2019-01-15 | End: 2019-01-15 | Stop reason: HOSPADM

## 2019-01-15 RX ORDER — FAMOTIDINE 20 MG/1
20 TABLET, FILM COATED ORAL ONCE
Status: COMPLETED | OUTPATIENT
Start: 2019-01-15 | End: 2019-01-15

## 2019-01-15 RX ORDER — LIDOCAINE HYDROCHLORIDE 10 MG/ML
INJECTION, SOLUTION INFILTRATION; PERINEURAL AS NEEDED
Status: DISCONTINUED | OUTPATIENT
Start: 2019-01-15 | End: 2019-01-15 | Stop reason: SURG

## 2019-01-15 RX ORDER — PROMETHAZINE HYDROCHLORIDE 25 MG/ML
12.5 INJECTION, SOLUTION INTRAMUSCULAR; INTRAVENOUS EVERY 4 HOURS PRN
Status: DISCONTINUED | OUTPATIENT
Start: 2019-01-15 | End: 2019-01-16 | Stop reason: HOSPADM

## 2019-01-15 RX ORDER — ACETAMINOPHEN 325 MG/1
650 TABLET ORAL EVERY 6 HOURS PRN
Status: DISCONTINUED | OUTPATIENT
Start: 2019-01-15 | End: 2019-01-16 | Stop reason: HOSPADM

## 2019-01-15 RX ORDER — DULOXETIN HYDROCHLORIDE 60 MG/1
60 CAPSULE, DELAYED RELEASE ORAL NIGHTLY
Status: DISCONTINUED | OUTPATIENT
Start: 2019-01-15 | End: 2019-01-16 | Stop reason: HOSPADM

## 2019-01-15 RX ORDER — ONDANSETRON 4 MG/1
4 TABLET, FILM COATED ORAL EVERY 6 HOURS PRN
Status: DISCONTINUED | OUTPATIENT
Start: 2019-01-15 | End: 2019-01-16 | Stop reason: HOSPADM

## 2019-01-15 RX ORDER — ONDANSETRON 2 MG/ML
4 INJECTION INTRAMUSCULAR; INTRAVENOUS ONCE AS NEEDED
Status: DISCONTINUED | OUTPATIENT
Start: 2019-01-15 | End: 2019-01-15 | Stop reason: HOSPADM

## 2019-01-15 RX ORDER — CEFAZOLIN SODIUM 2 G/100ML
2 INJECTION, SOLUTION INTRAVENOUS ONCE
Status: COMPLETED | OUTPATIENT
Start: 2019-01-15 | End: 2019-01-15

## 2019-01-15 RX ORDER — NEOSTIGMINE METHYLSULFATE 5 MG/5 ML
SYRINGE (ML) INTRAVENOUS AS NEEDED
Status: DISCONTINUED | OUTPATIENT
Start: 2019-01-15 | End: 2019-01-15 | Stop reason: SURG

## 2019-01-15 RX ORDER — SODIUM CHLORIDE 0.9 % (FLUSH) 0.9 %
3 SYRINGE (ML) INJECTION EVERY 12 HOURS SCHEDULED
Status: DISCONTINUED | OUTPATIENT
Start: 2019-01-15 | End: 2019-01-15 | Stop reason: HOSPADM

## 2019-01-15 RX ORDER — LABETALOL HYDROCHLORIDE 5 MG/ML
10 INJECTION, SOLUTION INTRAVENOUS EVERY 4 HOURS PRN
Status: DISCONTINUED | OUTPATIENT
Start: 2019-01-15 | End: 2019-01-16 | Stop reason: HOSPADM

## 2019-01-15 RX ORDER — GABAPENTIN 100 MG/1
100 CAPSULE ORAL 3 TIMES DAILY
Status: DISCONTINUED | OUTPATIENT
Start: 2019-01-15 | End: 2019-01-16 | Stop reason: HOSPADM

## 2019-01-15 RX ORDER — ATRACURIUM BESYLATE 10 MG/ML
INJECTION, SOLUTION INTRAVENOUS AS NEEDED
Status: DISCONTINUED | OUTPATIENT
Start: 2019-01-15 | End: 2019-01-15 | Stop reason: SURG

## 2019-01-15 RX ORDER — LIDOCAINE HYDROCHLORIDE 10 MG/ML
0.5 INJECTION, SOLUTION EPIDURAL; INFILTRATION; INTRACAUDAL; PERINEURAL ONCE AS NEEDED
Status: COMPLETED | OUTPATIENT
Start: 2019-01-15 | End: 2019-01-15

## 2019-01-15 RX ADMIN — PANTOPRAZOLE SODIUM 40 MG: 40 TABLET, DELAYED RELEASE ORAL at 21:55

## 2019-01-15 RX ADMIN — INSULIN LISPRO 2 UNITS: 100 INJECTION, SOLUTION INTRAVENOUS; SUBCUTANEOUS at 17:20

## 2019-01-15 RX ADMIN — INSULIN LISPRO 3 UNITS: 100 INJECTION, SOLUTION INTRAVENOUS; SUBCUTANEOUS at 21:55

## 2019-01-15 RX ADMIN — DEXAMETHASONE SODIUM PHOSPHATE 4 MG: 4 INJECTION, SOLUTION INTRAMUSCULAR; INTRAVENOUS at 08:14

## 2019-01-15 RX ADMIN — CEFAZOLIN SODIUM 2 G: 2 INJECTION, SOLUTION INTRAVENOUS at 15:38

## 2019-01-15 RX ADMIN — CEFAZOLIN SODIUM 2 G: 2 INJECTION, SOLUTION INTRAVENOUS at 07:59

## 2019-01-15 RX ADMIN — FAMOTIDINE 20 MG: 20 TABLET, FILM COATED ORAL at 06:48

## 2019-01-15 RX ADMIN — GLYCOPYRROLATE 0.4 MG: 0.2 INJECTION, SOLUTION INTRAMUSCULAR; INTRAVENOUS at 09:34

## 2019-01-15 RX ADMIN — METOPROLOL SUCCINATE 50 MG: 50 TABLET, EXTENDED RELEASE ORAL at 15:38

## 2019-01-15 RX ADMIN — GABAPENTIN 100 MG: 100 CAPSULE ORAL at 15:38

## 2019-01-15 RX ADMIN — BUPIVACAINE HYDROCHLORIDE 30 ML: 2.5 INJECTION, SOLUTION EPIDURAL; INFILTRATION; INTRACAUDAL; PERINEURAL at 07:38

## 2019-01-15 RX ADMIN — DEXAMETHASONE SODIUM PHOSPHATE 2 MG: 10 INJECTION, SOLUTION INTRAMUSCULAR; INTRAVENOUS at 07:38

## 2019-01-15 RX ADMIN — SODIUM CHLORIDE, POTASSIUM CHLORIDE, SODIUM LACTATE AND CALCIUM CHLORIDE 9 ML/HR: 600; 310; 30; 20 INJECTION, SOLUTION INTRAVENOUS at 06:48

## 2019-01-15 RX ADMIN — HYDROCODONE BITARTRATE AND ACETAMINOPHEN 1 TABLET: 7.5; 325 TABLET ORAL at 13:03

## 2019-01-15 RX ADMIN — SODIUM CHLORIDE, POTASSIUM CHLORIDE, SODIUM LACTATE AND CALCIUM CHLORIDE: 600; 310; 30; 20 INJECTION, SOLUTION INTRAVENOUS at 07:54

## 2019-01-15 RX ADMIN — BUPIVACAINE HYDROCHLORIDE 30 ML: 2.5 INJECTION, SOLUTION EPIDURAL; INFILTRATION; INTRACAUDAL; PERINEURAL at 07:25

## 2019-01-15 RX ADMIN — PROPOFOL 150 MG: 10 INJECTION, EMULSION INTRAVENOUS at 08:01

## 2019-01-15 RX ADMIN — MIDAZOLAM HYDROCHLORIDE 2 MG: 1 INJECTION, SOLUTION INTRAMUSCULAR; INTRAVENOUS at 07:25

## 2019-01-15 RX ADMIN — GABAPENTIN 100 MG: 100 CAPSULE ORAL at 21:23

## 2019-01-15 RX ADMIN — DULOXETINE HYDROCHLORIDE 60 MG: 60 CAPSULE, DELAYED RELEASE ORAL at 21:23

## 2019-01-15 RX ADMIN — BUPRENORPHINE HYDROCHLORIDE 0.3 MG: 0.32 INJECTION INTRAMUSCULAR; INTRAVENOUS at 07:38

## 2019-01-15 RX ADMIN — Medication 3 MG: at 09:34

## 2019-01-15 RX ADMIN — ATRACURIUM BESYLATE 50 MG: 10 INJECTION, SOLUTION INTRAVENOUS at 08:01

## 2019-01-15 RX ADMIN — HYDROCODONE BITARTRATE AND ACETAMINOPHEN 1 TABLET: 7.5; 325 TABLET ORAL at 17:20

## 2019-01-15 RX ADMIN — ONDANSETRON 4 MG: 2 INJECTION INTRAMUSCULAR; INTRAVENOUS at 09:34

## 2019-01-15 RX ADMIN — FENTANYL CITRATE 100 MCG: 50 INJECTION, SOLUTION INTRAMUSCULAR; INTRAVENOUS at 07:25

## 2019-01-15 RX ADMIN — POTASSIUM CHLORIDE AND SODIUM CHLORIDE 75 ML/HR: 450; 150 INJECTION, SOLUTION INTRAVENOUS at 15:38

## 2019-01-15 RX ADMIN — PROPOFOL 25 MCG/KG/MIN: 10 INJECTION, EMULSION INTRAVENOUS at 08:15

## 2019-01-15 RX ADMIN — SODIUM CHLORIDE, POTASSIUM CHLORIDE, SODIUM LACTATE AND CALCIUM CHLORIDE 9 ML/HR: 600; 310; 30; 20 INJECTION, SOLUTION INTRAVENOUS at 10:06

## 2019-01-15 RX ADMIN — ROPIVACAINE HYDROCHLORIDE 6 ML/HR: 5 INJECTION, SOLUTION EPIDURAL; INFILTRATION; PERINEURAL at 09:37

## 2019-01-15 RX ADMIN — PANTOPRAZOLE SODIUM 40 MG: 40 TABLET, DELAYED RELEASE ORAL at 15:38

## 2019-01-15 RX ADMIN — LIDOCAINE HYDROCHLORIDE 0.3 ML: 10 INJECTION, SOLUTION EPIDURAL; INFILTRATION; INTRACAUDAL; PERINEURAL at 06:48

## 2019-01-15 RX ADMIN — LIDOCAINE HYDROCHLORIDE 50 MG: 10 INJECTION, SOLUTION INFILTRATION; PERINEURAL at 08:01

## 2019-01-15 NOTE — ANESTHESIA PROCEDURE NOTES
ANESTHESIA INTUBATION  Urgency: elective    Airway not difficult    General Information and Staff    Patient location during procedure: OR  CRNA: Tori Parra CRNA    Indications and Patient Condition  Indications for airway management: airway protection    Preoxygenated: yes  MILS not maintained throughout  Mask difficulty assessment: 1 - vent by mask    Final Airway Details  Final airway type: endotracheal airway      Successful airway: ETT  Cuffed: yes   Successful intubation technique: direct laryngoscopy  Endotracheal tube insertion site: oral  Blade: Stevens  Blade size: 2  ETT size (mm): 7.0  Cormack-Lehane Classification: grade I - full view of glottis  Placement verified by: chest auscultation and capnometry   Measured from: lips  ETT to lips (cm): 20  Number of attempts at approach: 1    Additional Comments  Negative epigastric sounds, Breath sound equal bilaterally with symmetric chest rise and fall.  No teeth, atraumatic

## 2019-01-15 NOTE — PLAN OF CARE
Problem: Patient Care Overview  Goal: Plan of Care Review  Outcome: Ongoing (interventions implemented as appropriate)   01/15/19 0392   Plan of Care Review   Progress improving   OTHER   Outcome Summary Patient able to hop 7 feet with RW and t/f to BSC with CGA, limited by fatigue. Will progress to gait training on rolling knee walker and stair training in AM. Plan is d/c home with family.    Coping/Psychosocial   Plan of Care Reviewed With patient

## 2019-01-15 NOTE — BRIEF OP NOTE
Orthopedics ORIF left ankle fracture  Brief Op Note    Angelica Stewart  1/15/2019    Pre-op Diagnosis:   Ankle fracture, bimalleolar, closed, left, initial encounter [S82.842A] with syndesmotic disruption    Post-op Diagnosis:  same       Post-Op Diagnosis Codes:     * Ankle fracture, bimalleolar, closed, left, initial encounter [S82.842A]    Procedure(s):  ORIF left ankle fracture    Surgeon(s):  Sherry Faust MD    Anesthesia:  General with Block    Staff:   Circulator: Rosa Gale RN  Radiology Technologist: Andrew Desai  Scrub Person: Marion Varela  Assistant: Alva Sepulveda PA-C    Estimated Blood Loss:10cc      Specimens: none      Drains:  none    Complications:  None    Tourniquet:: 72min    Dressing:splint    Disposition:rr stable    Sherry Faust MD     Date: 1/15/2019  Time: 9:40 AM

## 2019-01-15 NOTE — ANESTHESIA PREPROCEDURE EVALUATION
Anesthesia Evaluation     NPO Solid Status: > 8 hours  NPO Liquid Status: > 4 hours           Airway   Mallampati: II  TM distance: >3 FB  Neck ROM: full  No difficulty expected  Dental    (+) edentulous    Pulmonary    (+) decreased breath sounds,   (-) asthma, not a smoker  Cardiovascular     ECG reviewed  Rhythm: regular  Rate: normal    (+) hypertension,   (-) pacemaker, angina, murmur, cardiac stents, CABG      Neuro/Psych  (-) seizures, TIA, CVA  GI/Hepatic/Renal/Endo    (+) obesity,  GERD,  diabetes mellitus (NIDDM),   (-) liver disease, no renal disease    Musculoskeletal     Abdominal    Substance History      OB/GYN          Other                        Anesthesia Plan    ASA 3     general   (GA  Popliteal N bblock)  intravenous induction     Plan discussed with CRNA.

## 2019-01-15 NOTE — DISCHARGE INSTRUCTIONS
.1. Do not put weight on operated foot, use crutches, wheelchair,  walker or knee walker  2. Elevate operated foot over heart  3. Keep the splint dry and intact- the ace bandage may be tightened or loosened, but do not remove the splint underneath the ace bandage.    4. Call the office if any problems: [phone]  5. Take the Zofran with the pain meds if you have nausea/vomiting  6. Take lovenox to help prevent blood clots

## 2019-01-15 NOTE — OP NOTE
Operative Report    01/15/19  9:41 AM    Preoperative diagnosis: Comminuted left bimalleolar ankle fracture with syndesmotic disruption    Postoperative diagnosis: Same    Anesthesia: Gen. with blocks for postop pain control    Surgeon: Sherry Faust M.D.    Assistant: ricardo RUIZ, present for the entire procedure including prepping, draping, retraction, closure, dressing.    Operative procedure: 1 open reduction internal fixation left bimalleolar ankle fracture 2.  Open reduction internal fixation left syndesmotic disruption    Operative indications: This is a very pleasant 58-year-old female with significant left ankle fracture dislocation.  She was reduced and splinted at another institution.  The fracture is comminuted.  On the lateral aspect there was a broken piece of fibula loose in the lateral joint.  The fibula fracture is a long oblique.  The syndesmosis was disrupted.  Medially she had an anterior column of the medial malleolus fracture, it was highly comminuted.  Through the medial fracture a 2 mm cartilage defect was visible from the talus impacting the broken bone.  The loose piece of cartilage was removed.    Operative procedure: The patient was taken to the operating room where general anesthesia was induced without difficulty.  She was given preoperative antibiotics and blocks.  The left leg was prepped and draped in the usual sterile fashion with a well-padded tourniquet on the thigh.  The appropriate timeout was called.  The leg was wrapped with an Esmarch elevated and the tourniquet inflated to 350 mmHg.  Tourniquet time was 72 minutes.  I made a 10 cm lateral incision over the fibula and carried this down through soft tissue bluntly, care was taken to create full-thickness flaps.  Neurovascular structures were protected.  The fibula was dissected subperiosteally.  It was a long oblique fracture.  It was comminuted.  There was an 8 mm x 6 mm broken piece of the anterior fibula that was  loose in the lateral joint.  This was removed and later replaced into its appropriate position.  The fracture was irrigated.  Hematoma was removed.  It was reduced anatomically and held with a clamp.  I placed an anterior to posterior lag screw.  I then chose a 7-hole one third tubular plate and contoured it appropriately.  This was placed across the fracture and the screw holes filled in standard fashion.  The syndesmosis was visibly loose.  It was loose even after fixation of the medial malleolus.  Attention was then directed medially.  I made a 5 cm incision over the anteromedial malleolus and carried this down through soft tissue bluntly.  The fracture was quite comminuted.  It was a piece of the anterior column.  It was carefully debrided.  I looked through the fracture into the joint and there was one 2 mm piece of cartilage broken off that was removed.  The fracture fragments were then reduced.  I held them temporarily with a small K wire.  I then was able to place a 2.7 mm lag screw across the largest piece, this held all the small pieces beneath it in good stable alignment.  There was only room for one screw.  There was no tendency for rotation.  C-arm in multiple planes showed anatomic alignment and appropriate position and length of the hardware.  I then used a large clamp to hold the fibula reduced appropriately and placed a syndesmotic screw.  Care was taken to keep the ankle in neutral alignment.  C-arm in multiple planes including AP, lateral, mortise showed anatomic alignment and appropriate position and length of the hardware.  The incisions were irrigated with antibiotic solution and closed in layers with Monocryl and nylon.  The incisions were dressed sterilely and she was placed in a 3 sided fiberglass splint.  She was awakened, extubated and transferred to recovery room in stable condition.  Tourniquet was released after splint placement.  Plan will be admission for elevation and pain  control.    Estimated blood loss: 10 cc    Specimens: None    Drains: None    Complications: None    Sherry Faust MD  01/15/19  9:41 AM

## 2019-01-15 NOTE — ANESTHESIA PROCEDURE NOTES
Adductor Single Shot      Patient reassessed immediately prior to procedure    Patient location during procedure: post-op  Reason for block: at surgeon's request and post-op pain management  Performed by  CRNA: Teena Vera CRNA  Assisted by: Tori Parra CRNA  Preanesthetic Checklist  Completed: patient identified, site marked, surgical consent, pre-op evaluation, timeout performed, IV checked, risks and benefits discussed and monitors and equipment checked  Prep:  Sterile barriers:cap, gloves, mask and sterile barriers  Prep: ChloraPrep  Patient monitoring: blood pressure monitoring, continuous pulse oximetry and EKG  Procedure  Sedation:yes    Guidance:ultrasound guided  Images:still images obtained    Laterality:left  Block Type:adductor canal block  Injection Technique:single-shot  Needle Type:short-bevel and echogenic  Needle Gauge:20 G    Catheter size: 20g.  Medications Used: buprenorphine (BUPRENEX) injection, 0.3 mg  dexamethasone sodium phosphate injection, 2 mg  bupivacaine PF (MARCAINE) 0.25 % injection, 30 mL  Med admintered at 1/15/2019 7:38 AM  Post Assessment  Injection Assessment: negative aspiration for heme, incremental injection and no paresthesia on injection  Patient Tolerance:comfortable throughout block  Complications:no  Additional Notes  Procedure:             The pt was placed in the Supine position.  The Insertion site was  prepped and Draped in sterile fashion.  The pt was anesthetized with  IV Sedation( see meds).  Skin and cutaneous tissue was infiltrated and anesthetized with 1% Lidocaine 3 mls via a 25g needle.  A BBraun 4 inch 18g echogenic needle was then  inserted approximately midline, mid-thigh and advanced In-plane with Ultrasound guidance.  Normal Saline PSF was utilized for hydrodissection of tissue.  The Vastus medialis and Sartorius muscle where visualized and the needle tip was placed in the adductor canal,  lateral to the femoral artery.  LA injection spread  was visualized, injection was incremental 1-5ml, injection pressure was normal or little, no intraneural injection, no vascular injection.  LA dose was injected thru the needle(see dose above).  A BBraun 20g wire stylet catheter was placed via the needle with ultrasound visualization and confirmation with NS fluid bolus. The labeled Catheter was then secured to skin at insertion site with skin afix and steristrips to curled catheter and CHG transparent dressing.  Thank you.

## 2019-01-15 NOTE — THERAPY EVALUATION
"Acute Care - Physical Therapy Initial Evaluation  [facility]     Patient Name: Angelica Stewart  : 1960  MRN: [id number]  Today's Date: 1/15/2019   Onset of Illness/Injury or Date of Surgery: 01/15/19  Date of Referral to PT: 01/15/19  Referring Physician: MD Hal      Admit Date: 1/15/2019    Visit Dx:     ICD-10-CM ICD-9-CM   1. Impaired functional mobility, balance, gait, and endurance Z74.09 V49.89   2. Ankle fracture, bimalleolar, closed, left, initial encounter S82.842A 824.4     Patient Active Problem List   Diagnosis   • Anal fistula   • Screening for colon cancer   • Ankle fracture, bimalleolar, closed, left, initial encounter   • Fibromyalgia   • S/P ORIF left bimalleolar ankle fracture   • HTN (hypertension)   • DM (diabetes mellitus) (CMS/HCC)     Past Medical History:   Diagnosis Date   • Acid reflux    • Anal fistula    • Arthritis    • Body piercing     EARS   • Fibromyalgia     PATIENT REPORTS \"IT'S SELF DIAGNOSED\"   • Full dentures     INSTRUCTED NO ADHESIVES DOS   • Hypertension    • Migraines    • Migraines    • PONV (postoperative nausea and vomiting)    • Rectocele    • Wears contact lenses      Past Surgical History:   Procedure Laterality Date   • BREAST BIOPSY     • BREAST SURGERY Bilateral     REPORTS BILATERAL BIOPSIES WITH TUMOR MARKER PLACEMENT   • COLONOSCOPY N/A 2017    Procedure: COLONOSCOPY;  Surgeon: Manisha Potter MD;  Location: [facility] ENDOSCOPY;  Service:    • ENDOSCOPY     • MOUTH SURGERY      REPORTS FULL MOUTH EXTRACTION AT AGE 14   • OTHER SURGICAL HISTORY      REPORTS CYSTS REMOVED FROM THROAT        PT ASSESSMENT (last 12 hours)      Physical Therapy Evaluation     Row Name 01/15/19 1447          PT Evaluation Time/Intention    Subjective Information  complains of;numbness from nerve catheter  -LR     Document Type  evaluation  -LR     Mode of Treatment  physical therapy;individual therapy  -LR     Patient Effort  excellent  -LR     Symptoms Noted " During/After Treatment  none  -LR     Row Name 01/15/19 1447          General Information    Patient Profile Reviewed?  yes  -LR     Onset of Illness/Injury or Date of Surgery  01/15/19  -LR     Referring Physician  MD Hal  -LR     Patient Observations  alert;cooperative;agree to therapy  -LR     General Observations of Patient  Patient supine in bed upon arrival. L LE elevated and splinted. L popliteal nerve catheter.   -LR     Prior Level of Function  independent:;all household mobility;community mobility;gait;transfer;bed mobility;ADL's;home management;cooking;driving;cleaning;shopping;using stairs;work using borrowed rolling knee walker since injury  -LR     Equipment Currently Used at Home  commode, bedside;crutches, axillary;shower chair;walker, rolling;other (see comments) using borrowed rolling knee walker since injury  -LR     Pertinent History of Current Functional Problem  Patient presents for surgical management of comminuted L bimalleolar ankle fx with syndesmotic disruption that occurred when patient fell when getting out of a car last Saturday.   -LR     Existing Precautions/Restrictions  fall;non-weight bearing;left;other (see comments) NWB L LE, L popliteal nerve catheter  -LR     Limitations/Impairments  -- none  -LR     Equipment Issued to Patient  -- none  -LR     Equipment Ordered for Patient  -- none  -LR     Risks Reviewed  patient:;LOB;nausea/vomiting;dizziness;increased discomfort;lines disloged  -LR     Benefits Reviewed  patient:;improve function;increase independence;increase strength;increase balance;decrease pain;decrease risk of DVT;increase knowledge  -LR     Barriers to Rehab  none identified  -LR     Row Name 01/15/19 1447          Relationship/Environment    Primary Source of Support/Comfort  spouse available to assist at all times upon d/c home.   -LR     Lives With  spouse  -LR     Row Name 01/15/19 1447          Resource/Environmental Concerns    Current Living Arrangements   home/apartment/condo  -LR     Resource/Environmental Concerns  none  -LR     Transportation Concerns  car, none  -LR     Row Name 01/15/19 1447          Home Main Entrance    Number of Stairs, Main Entrance  two  -LR     Stair Railings, Main Entrance  none  -LR     Stairs Comment, Main Entrance  2 separate steps, deep enough to use RW  -LR     Row Name 01/15/19 1447          Cognitive Assessment/Intervention- PT/OT    Orientation Status (Cognition)  oriented x 4  -LR     Follows Commands (Cognition)  WFL;follows one step commands;over 90% accuracy;verbal cues/prompting required;repetition of directions required  -LR     Safety Deficit (Cognitive)  mild deficit;safety precautions awareness;safety precautions follow-through/compliance  -LR     Row Name 01/15/19 1447          Safety Issues, Functional Mobility    Safety Issues Affecting Function (Mobility)  safety precaution awareness;safety precautions follow-through/compliance;sequencing abilities  -LR     Row Name 01/15/19 1447          Mobility Assessment/Treatment    Extremity Weight-bearing Status  left lower extremity  -LR     Left Lower Extremity (Weight-bearing Status)  non weight-bearing (NWB)  (Significant)   -LR     Row Name 01/15/19 1447          Bed Mobility Assessment/Treatment    Bed Mobility Assessment/Treatment  supine-sit  -LR     Supine-Sit [place] (Bed Mobility)  verbal cues;supervision  -LR     Bed Mobility, Safety Issues  decreased use of legs for bridging/pushing  -LR     Assistive Device (Bed Mobility)  head of bed elevated;bed rails  -LR     Comment (Bed Mobility)  Verbal cues to move LEs towards EOB and to push up from bed from behind to raise trunk into sitting and to scoot hips out to get feet on floor. Denied dizziness upon sitting up.   -LR     Row Name 01/15/19 1447          Transfer Assessment/Treatment    Transfer Assessment/Treatment  sit-stand transfer;stand-sit transfer;toilet transfer  -LR     Maintains Weight-bearing Status  (Transfers)  able to maintain  -LR     Comment (Transfers)  Verbal cues to push up from bed to stand and to reach back for chair to lower into sitting. Verbal cues for NWB on L during t/f. Verbal cues to hop on R LE towards EOB. Cues to use RW correctly, to stay inside RW. Patient able to hop from bed to BSC. Cues to use armrests of BSC for UE support.   -LR     Sit-Stand [place] (Transfers)  verbal cues;contact guard  -LR     Stand-Sit [place] (Transfers)  verbal cues;contact guard  -LR     [place] Level (Toilet Transfer)  verbal cues;contact guard  -LR     Assistive Device (Toilet Transfer)  commode, bedside without drop arms;walker, front-wheeled  -LR     Row Name 01/15/19 1447          Sit-Stand Transfer    Assistive Device (Sit-Stand Transfers)  walker, front-wheeled  -LR     Row Name 01/15/19 1447          Stand-Sit Transfer    Assistive Device (Stand-Sit Transfers)  walker, front-wheeled  -LR     Row Name 01/15/19 1447          Toilet Transfer    Type (Toilet Transfer)  sit-stand;stand-sit  -LR     Row Name 01/15/19 1447          Gait/Stairs Assessment/Training    57511 - Gait Training Minutes   1  -LR     [place] Level (Gait)  verbal cues;contact guard;1 person to manage equipment  -LR     Assistive Device (Gait)  walker, front-wheeled  -LR     Distance in Feet (Gait)  7  -LR     Pattern (Gait)  swing-to  -LR     Deviations/Abnormal Patterns (Gait)  bilateral deviations;geremias decreased;gait speed decreased;stride length decreased  -LR     Bilateral Gait Deviations  forward flexed posture;heel strike decreased  -LR     Maintains Weight-bearing Status (Gait)  able to maintain  -LR     Comment (Gait/Stairs)  Patient ambulated with swing to gait pattern at slow pace. Verbal cues for correct sequencing of advancement of RW and hopping. Gait limited by fatigue.   -LR     Row Name 01/15/19 1447          General ROM    RT Lower Ext  Comment  -LR     LT Lower Ext  Comment  -LR     Row Name  01/15/19 1447          Right Lower Ext    RT Lower Extremity Comments  R LE AROM WFL  -LR     Row Name 01/15/19 1447          Left Lower Ext    LT Lower Extremity Comments  L LE AROM WFL, except for L ankle fixed in splint  -LR     Row Name 01/15/19 1447          MMT (Manual Muscle Testing)    Rt Lower Ext  Rt Hip WFL;Rt Knee WFL;Rt Ankle WFL  -LR     Lt Lower Ext  Lt Hip WFL;Lt Knee WFL;Comments  -LR     Row Name 01/15/19 1447          MMT Left Lower Ext    Lt Lower Extremity Comments   L ankle fixed in splint  -LR     Row Name 01/15/19 1447          Motor Assessment/Intervention    Additional Documentation  Therapeutic Exercise (Group);Therapeutic Exercise Interventions (Group)  -     Row Name 01/15/19 1447          Therapeutic Exercise    20742 - PT Therapeutic Activity Minutes  7  -     Row Name 01/15/19 1447          Sensory Assessment/Intervention    Sensory General Assessment  light touch sensation deficits identified;other (see comments) c/o numbness L lower leg and foot/ankle  -LR     Row Name 01/15/19 1447          Vision Assessment/Intervention    Visual Impairment/Limitations  WFL  -LR     Row Name 01/15/19 1447          Light Touch Sensation Assessment    Left Lower Extremity: Light Touch Sensation Assessment  severe impairment, less than 50% correct responses  -LR     Comment, Left Lower Extremity: Light Touch Sensation Assessment  L toes d/t nerve catheter  -     Row Name 01/15/19 1447          Pain Assessment    Additional Documentation  Pain Scale: Numbers Pre/Post-Treatment (Group)  -     Row Name 01/15/19 1447          Pain Scale: Numbers Pre/Post-Treatment    Pain Scale: Numbers, Pretreatment  0/10 - no pain  -     Pain Scale: Numbers, Post-Treatment  0/10 - no pain  -     Row Name             Wound 01/15/19 0900 Left ankle incision    Wound - Properties Group Date first assessed: 01/15/19  -KS Time first assessed: 0900  -KS Side: Left  -KS Location: ankle  -KS Type: incision  -KS     Row Name 01/15/19 1447          Coping    Observed Emotional State  accepting;cooperative  -LR     Verbalized Emotional State  acceptance  -LR     Row Name 01/15/19 1447          Plan of Care Review    Plan of Care Reviewed With  patient  -LR     Row Name 01/15/19 1447          Physical Therapy Clinical Impression    Date of Referral to PT  01/15/19  -LR     PT Diagnosis (PT Clinical Impression)  displaced comminuted L bimalleolar ankle fx with syndesmotic disruption/ s/p ORIF L bimalleolar ankle fx/ ORIF syndesmotic fx  -LR     Prognosis (PT Clinical Impression)  good  -LR     Patient/Family Goals Statement (PT Clinical Impression)  go home, decrease pain  -LR     Criteria for Skilled Interventions Met (PT Clinical Impression)  yes;treatment indicated  -LR     Rehab Potential (PT Clinical Summary)  good, to achieve stated therapy goals  -LR     Care Plan Review (PT)  evaluation/treatment results reviewed;care plan/treatment goals reviewed;risks/benefits reviewed;current/potential barriers reviewed;patient/other agree to care plan  -LR     Row Name 01/15/19 1447          Physical Therapy Goals    Bed Mobility Goal Selection (PT)  bed mobility, PT goal 1  -LR     Transfer Goal Selection (PT)  transfer, PT goal 1  -LR     Gait Training Goal Selection (PT)  gait training, PT goal 1  -LR     Stairs Goal Selection (PT)  stairs, PT goal 1  -LR     Additional Documentation  Stairs Goal Selection (PT) (Row)  -LR     Row Name 01/15/19 1447          Bed Mobility Goal 1 (PT)    Activity/Assistive Device (Bed Mobility Goal 1, PT)  sit to supine/supine to sit  -LR     [place] Level/Cues Needed (Bed Mobility Goal 1, PT)  conditional independence  -LR     Time Frame (Bed Mobility Goal 1, PT)  long term goal (LTG);5 days  -LR     Progress/Outcomes (Bed Mobility Goal 1, PT)  goal ongoing  -LR     Row Name 01/15/19 1447          Transfer Goal 1 (PT)    Activity/Assistive Device (Transfer Goal 1, PT)   sit-to-stand/stand-to-sit;walker, rolling  -LR     [place] Level/Cues Needed (Transfer Goal 1, PT)  conditional independence  -LR     Time Frame (Transfer Goal 1, PT)  long term goal (LTG);5 days  -LR     Progress/Outcome (Transfer Goal 1, PT)  goal ongoing  -LR     Row Name 01/15/19 1447          Gait Training Goal 1 (PT)    Activity/Assistive Device (Gait Training Goal 1, PT)  gait (walking locomotion);other (see comments) rolling knee walker  -LR     [place] Level (Gait Training Goal 1, PT)  conditional independence  -LR     Distance (Gait Goal 1, PT)  500 feet  -LR     Time Frame (Gait Training Goal 1, PT)  long term goal (LTG);5 days  -LR     Progress/Outcome (Gait Training Goal 1, PT)  goal ongoing  -LR     Row Name 01/15/19 1447          Stairs Goal 1 (PT)    Activity/Assistive Device (Stairs Goal 1, PT)  ascending stairs;descending stairs;step-to-step;walker, rolling;other (see comments) backwards  -LR     [place] Level/Cues Needed (Stairs Goal 1, PT)  contact guard assist  -LR     Number of Stairs (Stairs Goal 1, PT)  1  -LR     Time Frame (Stairs Goal 1, PT)  long term goal (LTG);5 days  -LR     Progress/Outcome (Stairs Goal 1, PT)  goal ongoing  -LR     Row Name 01/15/19 1447          Positioning and Restraints    Pre-Treatment Position  in bed  -LR     Post Treatment Position  chair  -LR     In Chair  notified nsg;reclined;sitting;call light within reach;encouraged to call for assist;exit alarm on;compression device;legs elevated;LLE elevated  -LR     Row Name 01/15/19 1447          Living Environment    Home Accessibility  not wheelchair accessible;stairs to enter home;other (see comments);tub/shower is not walk in walk in shower  -LR       User Key  (r) = Recorded By, (t) = Taken By, (c) = Cosigned By    Initials Name Provider Type    LR Chelsea Mendes, PT Physical Therapist    Rosa Arizmendi RN Registered Nurse        Physical Therapy Education     Title: PT OT SLP  Therapies (Done)     Topic: Physical Therapy (Done)     Point: Mobility training (Done)     Learning Progress Summary           Patient Acceptance, E,D, VU,NR by LR at 1/15/2019  2:47 PM    Comment:  Educated on NWB status of L LE, correct sit<->stand t/f technique, correct BSC t/f technique, correct hopping technique with RW, and progression of POC.                   Point: Home exercise program (Done)     Learning Progress Summary           Patient Acceptance, E,D, VU,NR by LR at 1/15/2019  2:47 PM    Comment:  Educated on NWB status of L LE, correct sit<->stand t/f technique, correct BSC t/f technique, correct hopping technique with RW, and progression of POC.                   Point: Body mechanics (Done)     Learning Progress Summary           Patient Acceptance, E,D, VU,NR by LR at 1/15/2019  2:47 PM    Comment:  Educated on NWB status of L LE, correct sit<->stand t/f technique, correct BSC t/f technique, correct hopping technique with RW, and progression of POC.                   Point: Precautions (Done)     Learning Progress Summary           Patient Acceptance, E,D, VU,NR by LR at 1/15/2019  2:47 PM    Comment:  Educated on NWB status of L LE, correct sit<->stand t/f technique, correct BSC t/f technique, correct hopping technique with RW, and progression of POC.                               User Key     Initials Effective Dates Name Provider Type Discipline     06/19/15 -  Chelsea Mendes, PT Physical Therapist PT              PT Recommendation and Plan  Anticipated Discharge Disposition (PT): home with assist  Planned Therapy Interventions (PT Eval): balance training, bed mobility training, gait training, home exercise program, patient/family education, stair training, ROM (range of motion), strengthening, transfer training  Therapy Frequency (PT Clinical Impression): daily  Outcome Summary/Treatment Plan (PT)  Anticipated Equipment Needs at Discharge (PT): rolling knee walker  Anticipated  Discharge Disposition (PT): home with assist  Plan of Care Reviewed With: patient  Progress: improving  Outcome Summary: Patient able to hop 7 feet with RW and [place] [facility] [place] limited by fatigue. Will progress to gait training on rolling knee walker and stair training in AM. Plan is d/c home with family.   Outcome Measures     Row Name 01/15/19 1447             How much help from another person do you currently need...    Turning from your back to your side while in flat bed without using bedrails?  4  -LR      Moving from lying on back to sitting on the side of a flat bed without bedrails?  3  -LR      Moving to and from a bed to a chair (including a wheelchair)?  3  -LR      Standing up from a chair using your arms (e.g., wheelchair, bedside chair)?  3  -LR      Climbing 3-5 steps with a railing?  2  -LR      To walk in hospital room?  3  -LR      AM-PAC 6 Clicks Score  18  -LR         Functional Assessment    Outcome Measure Options  AM-PAC 6 Clicks Basic Mobility (PT)  -LR        User Key  (r) = Recorded By, (t) = Taken By, (c) = Cosigned By    Initials Name Provider Type    LR Chelsea Mendes, PT Physical Therapist         Time Calculation:   PT Charges     Row Name 01/15/19 1447             Time Calculation    Start Time  1447  -LR      PT Received On  01/15/19  -LR      PT Goal Re-Cert Due Date  01/25/19  -LR         Time Calculation- PT    Total Timed Code Minutes- PT  8 minute(s)  -LR         Timed Charges    13595 - Gait Training Minutes   1  -LR      60140 - PT Therapeutic Activity Minutes  7  -LR        User Key  (r) = Recorded By, (t) = Taken By, (c) = Cosigned By    Initials Name Provider Type    Chelsea Pearson, PT Physical Therapist        Therapy Suggested Charges     Code   Minutes Charges    75091 (CPT®) Hc Pt Neuromusc Re Education Ea 15 Min      45360 (CPT®) Hc Pt Ther Proc Ea 15 Min      75320 (CPT®) Hc Gait Training Ea 15 Min 1     47067 (CPT®) Hc Pt Therapeutic Act Ea 15  Min 7 1    34697 (CPT®) Hc Pt Manual Therapy Ea 15 Min      65120 (CPT®) Hc Pt Iontophoresis Ea 15 Min      22619 (CPT®) Hc Pt Elec Stim Ea-Per 15 Min      95849 (CPT®) Hc Pt Ultrasound Ea 15 Min      00989 (CPT®) Hc Pt Self Care/Mgmt/Train Ea 15 Min      23852 (CPT®) Hc Pt Prosthetic (S) Train Initial Encounter, Each 15 Min      18124 (CPT®) Hc Pt Orthotic(S)/Prosthetic(S) Encounter, Each 15 Min      50939 (CPT®) Hc Orthotic(S) Mgmt/Train Initial Encounter, Each 15min      Total  8 1        Therapy Charges for Today     Code Description Service Date Service Provider Modifiers Qty    45158697789 HC PT THERAPEUTIC ACT EA 15 MIN 1/15/2019 Chelsea Mendes, PT GP 1    17108783976 HC PT THER SUPP EA 15 MIN 1/15/2019 Chelsea Mendes, PT GP 2    50666055523 HC PT EVAL LOW COMPLEXITY 3 1/15/2019 Chelsea Mendes, PT GP 1          PT G-Codes  Outcome Measure Options: AM-PAC 6 Clicks Basic Mobility (PT)  AM-PAC 6 Clicks Score: 18      Chelsea Mendes, PT  1/15/2019

## 2019-01-15 NOTE — ANESTHESIA POSTPROCEDURE EVALUATION
Patient: Angelica Stewart    Procedure Summary     Date:  01/15/19 Room / Location:   [facility]    Anesthesia Start:  0754 Anesthesia Stop:  0949    Procedure:  ORIF left ankle fracture (Left Ankle) Diagnosis:       Ankle fracture, bimalleolar, closed, left, initial encounter      (Ankle fracture, bimalleolar, closed, left, initial encounter [S82.713J])    Surgeon:  Sherry Faust MD Provider:  Cesar Farias MD    Anesthesia Type:  general ASA Status:  3          Anesthesia Type: general  Last vitals  BP   147/80   Temp   97.6   Pulse   90   Resp   16   SpO2   92     Post Anesthesia Care and Evaluation    Patient location during evaluation: PACU  Patient participation: waiting for patient participation  Level of consciousness: sleepy but conscious  Pain score: 0  Pain management: adequate  Airway patency: patent  Anesthetic complications: No anesthetic complications  PONV Status: none  Cardiovascular status: hemodynamically stable and acceptable  Respiratory status: nonlabored ventilation, acceptable, nasal cannula and oral airway  Hydration status: acceptable

## 2019-01-15 NOTE — H&P
Patient Name: Angelica Stewart  MRN: [id number]  : 1960  DOS: 1/15/2019    Attending: Sherry Hong MD    Primary Care Provider: Tacho Mendoza MD      Chief complaint:  Left ankle fracture    Subjective   Patient is a 58 y.o. female presented for scheduled surgery by Dr. Hong.  About a week ago she fell getting out of bed causing an injury to her left ankle.  He was seen at an outside hospital where her ankle was found to have fracture with dislocation.  This was reduced and splinted.  Her fracture is comminuted.  Today she underwent open reduction internal fixation of left bimalleolar ankle fracture and open reduction internal fixation of left syndesmotic disruption.  Procedure was done under general anesthesia with block.  It was tolerated well.  Seen in her room after surgery she is doing well with good pain control.  No nausea vomiting or shortness breath.  She has required only a few pain pills in the week since her injury.  She has taken ibuprofen intermittently.  Her preoperative workup showed her to have elevated hemoglobin A1c consistent with diabetes mellitus type 2.  This was communicated with Dr. Mendoza who has started her on Janumet.  He has previously had constipation that was treated and improved with Linzess.  She has history of fibromyalgia for which he is on a regimen of Neurontin, meloxicam, and Cymbalta.  She only complains of a mild headache currently.    Allergies:  Allergies   Allergen Reactions   • Penicillins Other (See Comments)     UNSURE REACTION, REPORTS WAS TOLD THIS WAS AN ALLERGY BY HER MOTHER.   • Celecoxib Rash     RASH        Meds:  Medications Prior to Admission   Medication Sig Dispense Refill Last Dose   • DULoxetine (CYMBALTA) 60 MG capsule Take 60 mg by mouth Daily.  1 2019 at 2200   • gabapentin (NEURONTIN) 100 MG capsule Take 100 mg by mouth 3 (Three) Times a Day.  0 2019 at 2200   • lansoprazole (PREVACID) 30 MG capsule Take 30 mg  "by mouth 2 (Two) Times a Day.  1 1/14/2019 at 2200   • meloxicam (MOBIC) 15 MG tablet Take 15 mg by mouth Daily.  1 1/14/2019 at 2200   • metoprolol succinate XL (TOPROL-XL) 25 MG 24 hr tablet Take 50 mg by mouth Daily.  1 1/14/2019 at 1100   • SITagliptin-MetFORMIN HCl (JANUMET PO) Take 1 tablet by mouth Daily.   1/14/2019 at 1100   • HYDROcodone-acetaminophen (NORCO) 7.5-325 MG per tablet Take 1-2 tablets by mouth Every 6 (Six) Hours As Needed for Moderate Pain  (as needed for post surgical pain). 60 tablet 0    • ibuprofen (ADVIL,MOTRIN) 800 MG tablet Take 800 mg by mouth Every 8 (Eight) Hours As Needed for Mild Pain .   1/8/2019   • linaclotide (LINZESS) 72 MCG capsule capsule Take 72 mcg by mouth Every Night.   More than a month at Unknown time   • ondansetron (ZOFRAN) 4 MG tablet Take 1 tablet by mouth Every 6 (Six) Hours As Needed for Nausea or Vomiting. 30 tablet 0 1/6/2019   • oxyCODONE-acetaminophen (PERCOCET) 5-325 MG per tablet Take 1 tablet by mouth Every 4 (Four) Hours As Needed.   1/5/2019   • oxyCODONE-acetaminophen (PERCOCET) 5-325 MG per tablet Take 1-2 tablets by mouth Every 6 (Six) Hours As Needed for Other (as needed for post surgical pain). 60 tablet 0    • SUMAtriptan (IMITREX) 100 MG tablet Take 100 mg by mouth Daily As Needed for Migraine.  1 More than a month at Unknown time         History:   Past Medical History:   Diagnosis Date   • Acid reflux    • Anal fistula    • Arthritis    • Body piercing     EARS   • Fibromyalgia     PATIENT REPORTS \"IT'S SELF DIAGNOSED\"   • Full dentures     INSTRUCTED NO ADHESIVES DOS   • Hypertension    • Migraines    • Migraines    • PONV (postoperative nausea and vomiting)    • Rectocele    • Wears contact lenses      Past Surgical History:   Procedure Laterality Date   • BREAST BIOPSY     • BREAST SURGERY Bilateral     REPORTS BILATERAL BIOPSIES WITH TUMOR MARKER PLACEMENT   • COLONOSCOPY N/A 9/25/2017    Procedure: COLONOSCOPY;  Surgeon: Manisha Potter, " "MD;  Location: [facility] ENDOSCOPY;  Service:    • ENDOSCOPY     • MOUTH SURGERY      REPORTS FULL MOUTH EXTRACTION AT AGE 14   • OTHER SURGICAL HISTORY  2007    REPORTS CYSTS REMOVED FROM THROAT     Family History   Problem Relation Age of Onset   • Breast cancer Mother 80   • Cancer Mother    • Collagen disease Mother    • Hypertension Mother    • Cancer Father         colon   • Heart attack Father    • Ovarian cancer Neg Hx      Social History     Tobacco Use   • Smoking status: Never Smoker   • Smokeless tobacco: Never Used   Substance Use Topics   • Alcohol use: No   • Drug use: No   .  Lives with her .  Works as a  for local .    Review of Systems  Pertinent items are noted in HPI, all other systems reviewed and negative    Vital Signs  /72 (BP Location: Right arm, Patient Position: Lying)   Pulse 94   Temp 97.3 °F (36.3 °C)   Resp 16   Ht 160 cm (63\")   Wt 99.8 kg (220 lb)   LMP  (LMP Unknown) Comment:  FOR 7YRS  SpO2 93%   BMI 38.97 kg/m²     Physical Exam:    General Appearance:    Alert, cooperative, in no acute distress   Head:    Normocephalic, without obvious abnormality, atraumatic   Eyes:            Lids and lashes normal, conjunctivae and sclerae normal, no   icterus, no pallor, corneas clear    Ears:    Ears appear intact with no abnormalities noted   Throat:   No oral lesions, no thrush, oral mucosa moist   Neck:   No adenopathy, supple, trachea midline, no thyromegaly         Lungs:     Clear to auscultation,respirations regular, even and                   unlabored    Heart:    Regular rhythm and normal rate, normal S1 and S2, no            murmur, no gallop    Abdomen:     Normal bowel sounds, no masses, no organomegaly, soft        non-tender, non-distended, no guarding, no rebound                 Tenderness. Obese.   Genitalia:    Deferred   Extremities:   Left ankle with CDI dressing, ACE over splint. Distal toes with normal cap refill. Cannot wiggle " yet due to block. Pop block cath present.   Pulses:   Pulses palpable and equal bilaterally   Skin:   No bleeding, bruising or rash   Neurologic:   Cranial nerves 2 - 12 grossly intact, A/A/O.       I reviewed the patient's new clinical results.             Invalid input(s): NEUTOPHILPCT,  EOSPCT  Results from last 7 days   Lab Units  01/15/19   0630   POTASSIUM mmol/L  4.1     Lab Results   Component Value Date    HGBA1C 7.00 (H) 01/07/2019     Results for HUA JUAN (MRN [id number]) as of 1/15/2019 14:01   Ref. Range 1/7/2019 15:31   Glucose Latest Ref Range: 70 - 100 mg/dL 81   Sodium Latest Ref Range: 132 - 146 mmol/L 138   Potassium Latest Ref Range: 3.5 - 5.5 mmol/L 4.5   CO2 Latest Ref Range: 20.0 - 31.0 mmol/L 28.0   Chloride Latest Ref Range: 99 - 109 mmol/L 105   Anion Gap Latest Ref Range: 3.0 - 11.0 mmol/L 5.0   Creatinine Latest Ref Range: 0.60 - 1.30 mg/dL 0.59 (L)   BUN Latest Ref Range: 9 - 23 mg/dL 12   BUN/Creatinine Ratio Latest Ref Range: 7.0 - 25.0  20.3   Calcium Latest Ref Range: 8.7 - 10.4 mg/dL 9.4   eGFR Non African Amer Latest Ref Range: >60 mL/min/1.73 104     Results for HUA JUAN (MRN [id number]) as of 1/15/2019 14:01   Ref. Range 1/7/2019 15:31   WBC Latest Ref Range: 3.50 - 10.80 10*3/mm3 11.25 (H)   RBC Latest Ref Range: 3.89 - 5.14 10*6/mm3 4.58   Hemoglobin Latest Ref Range: 11.5 - 15.5 g/dL 13.6   Hematocrit Latest Ref Range: 34.5 - 44.0 % 43.1   RDW Latest Ref Range: 11.3 - 14.5 % 13.0   MCV Latest Ref Range: 80.0 - 99.0 fL 94.1   MCH Latest Ref Range: 27.0 - 31.0 pg 29.7   MCHC Latest Ref Range: 32.0 - 36.0 g/dL 31.6 (L)   MPV Latest Ref Range: 6.0 - 12.0 fL 11.5   Platelets Latest Ref Range: 150 - 450 10*3/mm3 316   RDW-SD Latest Ref Range: 37.0 - 54.0 fl 44.6       Assessment and Plan:       S/P ORIF left bimalleolar ankle fracture    Ankle fracture, bimalleolar, closed, left, initial encounter    HTN (hypertension)    DM (diabetes mellitus) (CMS/HCC)     Obesity.    Plan  1. PT/OT. NWB LLE.  2. Pain control-prns. Pop block ( PNB)   3. IS-encourage  4. DVT proph- Mech/ lovenox.   5. Bowel regimen  6. Resume home medications as appropriate  7. Monitor post-op labs  8. DC planning for home.    HTN: resume home meds with holding parameters. Add prns.    DM2: SSI, FSBG. Diabetic education. Diabetic diet.     Fibromyalgia: resume home regimen.    Discussed with pt and .    Collette Garcia MD  01/15/19  1:54 PM

## 2019-01-15 NOTE — H&P
"Pre-Op H&P  Angelica Stewart  [id number]  1960    Chief complaint: Left ankle fracture.    HPI:  Patient is a 58 y.o.female who presents with a 1 week history of left ankle fracture.  She denies any recent changes in the left ankle or in her general health.     Review of Systems:  General ROS: negative for chills, fever or skin lesions;  No changes since last office visit  Cardiovascular ROS: no chest pain or dyspnea on exertion  Respiratory ROS: no cough, shortness of breath, or wheezing    Allergies:   Allergies   Allergen Reactions   • Penicillins Other (See Comments)     UNSURE REACTION, REPORTS WAS TOLD THIS WAS AN ALLERGY BY HER MOTHER.   • Celecoxib Rash     RASH        Home Meds:    No current facility-administered medications on file prior to encounter.      Current Outpatient Medications on File Prior to Encounter   Medication Sig Dispense Refill   • DULoxetine (CYMBALTA) 60 MG capsule Take 60 mg by mouth Daily.  1   • gabapentin (NEURONTIN) 100 MG capsule Take 100 mg by mouth 3 (Three) Times a Day.  0   • lansoprazole (PREVACID) 30 MG capsule Take 30 mg by mouth 2 (Two) Times a Day.  1   • meloxicam (MOBIC) 15 MG tablet Take 15 mg by mouth Daily.  1   • metoprolol succinate XL (TOPROL-XL) 25 MG 24 hr tablet Take 50 mg by mouth Daily.  1   • SITagliptin-MetFORMIN HCl (JANUMET PO) Take 1 tablet by mouth Daily.     • ibuprofen (ADVIL,MOTRIN) 800 MG tablet Take 800 mg by mouth Every 8 (Eight) Hours As Needed for Mild Pain .     • linaclotide (LINZESS) 72 MCG capsule capsule Take 72 mcg by mouth Every Night.     • oxyCODONE-acetaminophen (PERCOCET) 5-325 MG per tablet Take 1 tablet by mouth Every 4 (Four) Hours As Needed.     • SUMAtriptan (IMITREX) 100 MG tablet Take 100 mg by mouth Daily As Needed for Migraine.  1       PMH:   Past Medical History:   Diagnosis Date   • Acid reflux    • Anal fistula    • Arthritis    • Body piercing     EARS   • Fibromyalgia     PATIENT REPORTS \"IT'S SELF DIAGNOSED\" " "  • Full dentures     INSTRUCTED NO ADHESIVES DOS   • Hypertension    • Migraines    • Migraines    • PONV (postoperative nausea and vomiting)    • Rectocele    • Wears contact lenses      PSH:    Past Surgical History:   Procedure Laterality Date   • BREAST BIOPSY     • BREAST SURGERY Bilateral     REPORTS BILATERAL BIOPSIES WITH TUMOR MARKER PLACEMENT   • COLONOSCOPY N/A 9/25/2017    Procedure: COLONOSCOPY;  Surgeon: Manisha Potter MD;  Location: [facility] ENDOSCOPY;  Service:    • ENDOSCOPY     • MOUTH SURGERY      REPORTS FULL MOUTH EXTRACTION AT AGE 14   • OTHER SURGICAL HISTORY  2007    REPORTS CYSTS REMOVED FROM THROAT       Immunization History:  Influenza: yes  Pneumococcal: yes  Tetanus: unknown    Social History:   Tobacco:   Social History     Tobacco Use   Smoking Status Never Smoker   Smokeless Tobacco Never Used      Alcohol:     Social History     Substance and Sexual Activity   Alcohol Use No       Vitals:           /92   Pulse 60   Temp 98.2 °F (36.8 °C) (Temporal)   Resp 18   Ht 160 cm (63\")   Wt 99.8 kg (220 lb)   LMP  (LMP Unknown) Comment:  FOR 7YRS  SpO2 94%   BMI 38.97 kg/m²     Physical Exam:  General Appearance:    Alert, cooperative, no distress, appears stated age   Head:    Normocephalic, without obvious abnormality, atraumatic   Lungs:     Clear to auscultation bilaterally, respirations unlabored    Heart:   Regular rate and rhythm, S1 and S2 normal, no murmur, rub    or gallop    Abdomen:    Soft, non-tender.  +bowel sounds   Breast Exam:    deferred   Genitalia:    deferred   Extremities:   Extremities normal, atraumatic, no cyanosis or edema   Skin:   Skin color, texture, turgor normal, no rashes or lesions   Neurologic:   Grossly intact   Results Review  I reviewed the patient's new clinical results. EKG, CBC and Chem profile on chart.    Cancer Staging (if applicable)  Cancer Patient: __ yes _x_no __unknown; If yes, clinical stage T:__ N:__M:__, stage group or " __N/A    Impression: Left bimalleolar ankle fracture    Plan: For left ORIF ankle fracture today    SARA Easton   1/15/2019   6:49 AM

## 2019-01-15 NOTE — ANESTHESIA PROCEDURE NOTES
Popliteal Catheter      Patient reassessed immediately prior to procedure    Patient location during procedure: pre-op  Reason for block: at surgeon's request and post-op pain management  Performed by  CRNA: Teena Vera CRNA  Assisted by: Tori Parra CRNA  Preanesthetic Checklist  Completed: patient identified, site marked, surgical consent, pre-op evaluation, timeout performed, IV checked, risks and benefits discussed and monitors and equipment checked  Prep:  Sterile barriers:cap, gloves, mask and sterile barriers  Prep: ChloraPrep  Patient monitoring: blood pressure monitoring, continuous pulse oximetry and EKG  Procedure  Sedation:yes    Guidance:ultrasound guided  Images:still images obtained    Laterality:left  Block Type:popliteal  Injection Technique:catheter  Needle Type:echogenic  Needle Gauge:18 G    Catheter Size:20 G  Cath Depth at skin: 7 cm  Medications Used: bupivacaine PF (MARCAINE) 0.25 % injection, 30 mL  fentaNYL citrate (PF) (SUBLIMAZE) injection, 100 mcg  midazolam (VERSED) injection, 2 mg  Med admintered at 1/15/2019 7:25 AM  Post Assessment  Injection Assessment: negative aspiration for heme, no paresthesia on injection and incremental injection  Patient Tolerance:comfortable throughout block  Complications:no  Additional Notes  Procedure:                                                         The pt was placed in  lateral position.  The Insertion site was  prepped and Draped in sterile fashion.  The pt was anesthetized with  IV Sedation( see meds).  Skin and cutaneous tissue was infiltrated and anesthetized with 1% Lidocaine 3 mls via a 25g needle.  A BBraun 4 inch 18g echogenic needle was then  inserted approximately 3 cm proximal to the popliteal bud a at the lateral mid biceps femoris and advanced In-plane with Ultrasound guidance.  Normal Saline PSF was utilized for hydrodissection of tissue.  The popliteal artery was visualized and the common peroneal and tibial  bifurcation was located.  LA injection spread was visualized, injection was incremental 1-5ml, injection pressure was normal or little, no intraneural injection, no vascular injection.  .  A BBraun 20g wire stylet catheter was placed via the needle with ultrasound visualization and confirmation with NS fluid bolus. The labeled Catheter was then secured at insertions site with skin afix,  mastisol, steristrips  and a CHG transparent dressing was placed over. Thank you

## 2019-01-15 NOTE — PROGRESS NOTES
Orthopedic Foot/Ankle Progress Note    Subjective     Post-Op: ORIF left ankle wth syndesmotic fixation   Systemic or Specific Complaints: reasonable pain control  Objective     Vital signs in last 24 hours:  Temp:  [97 °F (36.1 °C)-98.2 °F (36.8 °C)] 97 °F (36.1 °C)  Heart Rate:  [60-90] 86  Resp:  [15-20] 18  BP: (117-157)/(83-96) 142/96    Neurovascular: Left toes pink, decrease motor and sensory due to block   Wound: Splint dry         Data Review  Lab Results (last 24 hours)     Procedure Component Value Units Date/Time    POC Glucose Once [[id number]]  (Abnormal) Collected:  01/15/19 1022    Specimen:  Blood Updated:  01/15/19 1026     Glucose 134 mg/dL     Potassium [[id number]]  (Normal) Collected:  01/15/19 0630    Specimen:  Blood Updated:  01/15/19 0651     Potassium 4.1 mmol/L     POC Glucose Once [[id number]]  (Normal) Collected:  01/15/19 0626    Specimen:  Blood Updated:  01/15/19 0628     Glucose 101 mg/dL             Assessment/Plan     Status post- stable, elevate, non-wt bear.  New diagnosis of diabetes- control glucose           Sherry Faust MD    Date: 1/15/2019  Time: 12:28 PM

## 2019-01-16 VITALS
WEIGHT: 220 LBS | HEIGHT: 63 IN | OXYGEN SATURATION: 94 % | BODY MASS INDEX: 38.98 KG/M2 | RESPIRATION RATE: 16 BRPM | TEMPERATURE: 98.1 F | DIASTOLIC BLOOD PRESSURE: 77 MMHG | SYSTOLIC BLOOD PRESSURE: 130 MMHG | HEART RATE: 71 BPM

## 2019-01-16 PROBLEM — G89.18 ACUTE POSTOPERATIVE PAIN: Status: ACTIVE | Noted: 2019-01-16

## 2019-01-16 LAB — GLUCOSE BLDC GLUCOMTR-MCNC: 123 MG/DL (ref 70–130)

## 2019-01-16 PROCEDURE — 82962 GLUCOSE BLOOD TEST: CPT

## 2019-01-16 PROCEDURE — G0378 HOSPITAL OBSERVATION PER HR: HCPCS

## 2019-01-16 PROCEDURE — G0108 DIAB MANAGE TRN  PER INDIV: HCPCS | Performed by: REGISTERED NURSE

## 2019-01-16 PROCEDURE — 25010000003 CEFAZOLIN IN DEXTROSE 2-4 GM/100ML-% SOLUTION: Performed by: ORTHOPAEDIC SURGERY

## 2019-01-16 PROCEDURE — 99024 POSTOP FOLLOW-UP VISIT: CPT | Performed by: ORTHOPAEDIC SURGERY

## 2019-01-16 PROCEDURE — 25010000002 ENOXAPARIN PER 10 MG: Performed by: ORTHOPAEDIC SURGERY

## 2019-01-16 PROCEDURE — 97116 GAIT TRAINING THERAPY: CPT

## 2019-01-16 RX ORDER — DOCUSATE SODIUM 100 MG/1
100 CAPSULE, LIQUID FILLED ORAL 2 TIMES DAILY
Qty: 60 CAPSULE | Refills: 0 | Status: SHIPPED | OUTPATIENT
Start: 2019-01-16

## 2019-01-16 RX ADMIN — GABAPENTIN 100 MG: 100 CAPSULE ORAL at 08:24

## 2019-01-16 RX ADMIN — CEFAZOLIN SODIUM 2 G: 2 INJECTION, SOLUTION INTRAVENOUS at 01:00

## 2019-01-16 RX ADMIN — PANTOPRAZOLE SODIUM 40 MG: 40 TABLET, DELAYED RELEASE ORAL at 08:25

## 2019-01-16 RX ADMIN — CEFAZOLIN SODIUM 2 G: 2 INJECTION, SOLUTION INTRAVENOUS at 08:21

## 2019-01-16 RX ADMIN — Medication 1 TABLET: at 08:24

## 2019-01-16 RX ADMIN — ENOXAPARIN SODIUM 40 MG: 40 INJECTION SUBCUTANEOUS at 08:25

## 2019-01-16 RX ADMIN — METOPROLOL SUCCINATE 50 MG: 50 TABLET, EXTENDED RELEASE ORAL at 08:28

## 2019-01-16 NOTE — CONSULTS
"Diabetes Education  Assessment/Teaching    Patient Name:  Angelica Stewart  YOB: 1960  MRN: [id number]  Admit Date:  1/15/2019      Assessment Date:  1/16/2019    Most Recent Value   General Information    Referral From:  MD sharma   Height  160 cm (63\")   Height Method  Stated   Weight  99.8 kg (220 lb)   Weight Method  --  (Significant)  [confirmed with pt]   Pregnancy Assessment   Diabetes History   What type of diabetes do you have?  Type 2   Length of Diabetes Diagnosis  Newly diagnosed <6 months   Current DM knowledge  fair   Have you had diabetes education/teaching in the past?  no   Do you test your blood sugar at home?  no   Education Preferences   Nutrition Information   Assessment Topics   Healthy Eating - Assessment  Needs education   Being Active - Assessment  Needs education   Taking Medication - Assessment  Needs education   Problem Solving - Assessment  Needs education   Reducing Risk - Assessment  Needs education   Healthy Coping - Assessment  Needs education   Monitoring - Assessment  Needs education   DM Goals            Most Recent Value   DM Education Needs   Meter  Meter provided   Meter Type  Accuchek   Medication  Oral   Problem Solving  Hypoglycemia, Hyperglycemia, Sick days, Signs, Symptoms, Treatment   Reducing Risks  A1C testing   Healthy Eating  Other (comment) [provided ADA book \"where do I begin?\" with plate method]   Physical Activity  None   Physical Activity Frequency  Never   Healthy Coping  Appropriate   Discharge Plan  Home   Motivation  Moderate   Teaching Method  Explanation, Discussion, Demonstration, Handouts, Teach back   Patient Response  Verbalized understanding            Other Comments:  Patient was seen for new diagnosis of type 2 diabetes. She stated she was diagnosed last week at her PCP office with 7% A1c. She was started on Janumet. Discussed and taught patient about type 2 diabetes self-management, risk factors, and importance of blood glucose " control to reduce complications. Target blood glucose readings and A1c goals per ADA were reviewed. Reviewed with patient current A1c and discussed its significance. Signs, symptoms and treatment of hyperglycemia and hypoglycemia were discussed. Lifestyle changes such as physical activity with MD approval and healthy eating were encouraged.  Emphasized importance of blood sugar control and healing. Patient provided and demonstrated blood glucose meter Accu Chek Guide (her insurance stated they prefer this meter as lowest cost out of pocket) and was able to return demonstration appropriately. Patient was encouraged to keep record of blood glucose readings to take to follow up appointment with PCP.  OP education was also encouraged for additional education once discharged.  Provided my contact information for questions after discharge and offered OP education if they are back in [place] (call prior to arrange referral and scheduling).Provided list of Health departments as OP education options closer to home.        Electronically signed by:  Krysten Garces RN  01/16/19 2:10 PM

## 2019-01-16 NOTE — PROGRESS NOTES
Discharge Planning Assessment  [facility]     Patient Name: Angelica Stewart  MRN: [id number]  Today's Date: 1/16/2019    Admit Date: 1/15/2019    Discharge Needs Assessment     Row Name 01/16/19 0952       Living Environment    Lives With  spouse    Name(s) of Who Lives With Patient  Rickie Stewart ( spouse) [phone]    Current Living Arrangements  home/apartment/condo    Primary Care Provided by  self    Provides Primary Care For  no one    Family Caregiver if Needed  spouse    Family Caregiver Names  Rickie ( spouse)    Quality of Family Relationships  helpful;involved;supportive       Resource/Environmental Concerns    Resource/Environmental Concerns  home accessibility    Home Accessibility Concerns  stairs to enter home one step into home    Transportation Concerns  car, none       Transition Planning    Patient/Family Anticipates Transition to  home with family    Patient/Family Anticipated Services at Transition  none    Transportation Anticipated  family or friend will provide       Discharge Needs Assessment    Readmission Within the Last 30 Days  no previous admission in last 30 days    Concerns to be Addressed  no discharge needs identified;adjustment to diagnosis/illness    Equipment Currently Used at Home  wheelchair;bath bench;commode;crutches, axillary;walker, rolling;other (see comments) has borrowed equipment, including a knee walker.    Anticipated Changes Related to Illness  inability to work    Equipment Needed After Discharge  none    Current Discharge Risk  physical impairment;dependent with mobility/activities of daily living        Discharge Plan     Row Name 01/16/19 0956       Plan    Plan  Home    Patient/Family in Agreement with Plan  yes    Plan Comments  I met with Mrs Hoffman and  at bedside to discuss d/c plan. She plans to return home.  will be available to assist with any care needs.. She has already borrowed all equipment needed. Prior  to admit she was  independent with all ADL's, working full time. No d/c needs identifed at this time    Final Discharge Disposition Code  01 - home or self-care        Destination      No service coordination in this encounter.      Durable Medical Equipment      No service coordination in this encounter.      Dialysis/Infusion      No service coordination in this encounter.      Home Medical Care      No service coordination in this encounter.      Community Resources      No service coordination in this encounter.          Demographic Summary     Row Name 01/16/19 0947       General Information    Admission Type  observation    Arrived From  PACU/recovery room    Referral Source  physician    Reason for Consult  discharge planning    Preferred Language  English    General Information Comments  PCP- Tacho Mendoza       Contact Information    Permission Granted to Share Info With  ;family/designee        Functional Status     Row Name 01/16/19 0948       Functional Status    Usual Activity Tolerance  good    Current Activity Tolerance  good    Functional Status Comments  independent with all ADL's prior to her injury       Functional Status, IADL    Medications  independent    Meal Preparation  independent;other (see comments)    Housekeeping  independent    Laundry  independent    Shopping  independent       Mental Status    General Appearance WDL  WDL       Mental Status Summary    Recent Changes in Mental Status/Cognitive Functioning  no changes       Employment/    Employment Status  employed full time    Shift Worked  first shift    Current or Previous Occupation  desk job works as a  for [employer]    Employment/ Comments  insurance- [employer]        Psychosocial    No documentation.       Abuse/Neglect    No documentation.       Legal    No documentation.       Substance Abuse    No documentation.       Patient Forms    No documentation.           Sonja C Kellerman, RN

## 2019-01-16 NOTE — THERAPY DISCHARGE NOTE
Acute Care - Physical Therapy Treatment Note/Discharge  [facility]     Patient Name: Angelica Stewart  : 1960  MRN: [id number]  Today's Date: 2019  Onset of Illness/Injury or Date of Surgery: 01/15/19  Date of Referral to PT: 01/15/19  Referring Physician: MD Hal    Admit Date: 1/15/2019    Visit Dx:    ICD-10-CM ICD-9-CM   1. Impaired functional mobility, balance, gait, and endurance Z74.09 V49.89   2. Ankle fracture, bimalleolar, closed, left, initial encounter S82.842A 824.4     Patient Active Problem List   Diagnosis   • Anal fistula   • Screening for colon cancer   • Ankle fracture, bimalleolar, closed, left, initial encounter   • Fibromyalgia   • S/P ORIF left bimalleolar ankle fracture   • HTN (hypertension)   • DM (diabetes mellitus) (CMS/[facility])       Physical Therapy Education     Title: PT OT SLP Therapies (Done)     Topic: Physical Therapy (Done)     Point: Mobility training (Done)     Learning Progress Summary           Patient Acceptance, E,D, VU,DU by LR at 2019 11:29 AM    Comment:  Educated on correct technique to t/f on and off rolling knee walker, educated on correct gait mechanics with rolling knee walker, educated on correct stair training technique with RW, and on correct car t/f technique.    Acceptance, E,D, VU,NR by LR at 1/15/2019  2:47 PM    Comment:  Educated on NWB status of L LE, correct sit<->stand t/f technique, correct BSC t/f technique, correct hopping technique with RW, and progression of POC.   Significant Other Acceptance, E,D, VU,DU by LR at 2019 11:29 AM    Comment:  Educated on correct technique to t/f on and off rolling knee walker, educated on correct gait mechanics with rolling knee walker, educated on correct stair training technique with RW, and on correct car t/f technique.                   Point: Home exercise program (Done)     Learning Progress Summary           Patient Acceptance, E,D, BENJAMIN,DU by LR at 2019 11:29 AM    Comment:   Educated on correct technique to t/f on and off rolling knee walker, educated on correct gait mechanics with rolling knee walker, educated on correct stair training technique with RW, and on correct car t/f technique.    Acceptance, E,D, VU,NR by LR at 1/15/2019  2:47 PM    Comment:  Educated on NWB status of L LE, correct sit<->stand t/f technique, correct BSC t/f technique, correct hopping technique with RW, and progression of POC.   Significant Other Acceptance, E,D, VU,DU by LR at 1/16/2019 11:29 AM    Comment:  Educated on correct technique to t/f on and off rolling knee walker, educated on correct gait mechanics with rolling knee walker, educated on correct stair training technique with RW, and on correct car t/f technique.                   Point: Body mechanics (Done)     Learning Progress Summary           Patient Acceptance, E,D, VU,DU by LR at 1/16/2019 11:29 AM    Comment:  Educated on correct technique to t/f on and off rolling knee walker, educated on correct gait mechanics with rolling knee walker, educated on correct stair training technique with RW, and on correct car t/f technique.    Acceptance, E,D, VU,NR by LR at 1/15/2019  2:47 PM    Comment:  Educated on NWB status of L LE, correct sit<->stand t/f technique, correct BSC t/f technique, correct hopping technique with RW, and progression of POC.   Significant Other Acceptance, E,D, VU,DU by LR at 1/16/2019 11:29 AM    Comment:  Educated on correct technique to t/f on and off rolling knee walker, educated on correct gait mechanics with rolling knee walker, educated on correct stair training technique with RW, and on correct car t/f technique.                   Point: Precautions (Done)     Learning Progress Summary           Patient Acceptance, E,D, VU,DU by LR at 1/16/2019 11:29 AM    Comment:  Educated on correct technique to t/f on and off rolling knee walker, educated on correct gait mechanics with rolling knee walker, educated on correct  stair training technique with RW, and on correct car t/f technique.    Acceptance, E,D, VU,NR by LR at 1/15/2019  2:47 PM    Comment:  Educated on NWB status of L LE, correct sit<->stand t/f technique, correct BSC t/f technique, correct hopping technique with RW, and progression of POC.   Significant Other Acceptance, E,D, BENJAMIN,DU by LR at 1/16/2019 11:29 AM    Comment:  Educated on correct technique to t/f on and off rolling knee walker, educated on correct gait mechanics with rolling knee walker, educated on correct stair training technique with RW, and on correct car t/f technique.                               User Key     Initials Effective Dates Name Provider Type Discipline    LR 06/19/15 -  Chelsea Mendes, PT Physical Therapist PT              Rehab Goal Summary     Row Name 01/16/19 1129             Physical Therapy Goals    Bed Mobility Goal Selection (PT)  bed mobility, PT goal 1  -LR      Transfer Goal Selection (PT)  transfer, PT goal 1  -LR      Gait Training Goal Selection (PT)  gait training, PT goal 1  -LR      Stairs Goal Selection (PT)  stairs, PT goal 1  -LR         Bed Mobility Goal 1 (PT)    Activity/Assistive Device (Bed Mobility Goal 1, PT)  sit to supine/supine to sit  -LR      [place] Level/Cues Needed (Bed Mobility Goal 1, PT)  conditional independence  -LR      Time Frame (Bed Mobility Goal 1, PT)  long term goal (LTG);5 days  -LR      Progress/Outcomes (Bed Mobility Goal 1, PT)  goal not met;discharged from facility  -LR         Transfer Goal 1 (PT)    Activity/Assistive Device (Transfer Goal 1, PT)  sit-to-stand/stand-to-sit;walker, rolling  -LR      [place] Level/Cues Needed (Transfer Goal 1, PT)  conditional independence  -LR      Time Frame (Transfer Goal 1, PT)  long term goal (LTG);5 days  -LR      Progress/Outcome (Transfer Goal 1, PT)  goal not met;discharged from facility  -LR         Gait Training Goal 1 (PT)    Activity/Assistive Device (Gait Training Goal 1,  PT)  gait (walking locomotion);other (see comments) rolling knee walker  -LR      [place] Level (Gait Training Goal 1, PT)  conditional independence  -LR      Distance (Gait Goal 1, PT)  500 feet  -LR      Time Frame (Gait Training Goal 1, PT)  long term goal (LTG);5 days  -LR      Progress/Outcome (Gait Training Goal 1, PT)  goal not met;discharged from facility  -LR         Stairs Goal 1 (PT)    Activity/Assistive Device (Stairs Goal 1, PT)  ascending stairs;descending stairs;step-to-step;walker, rolling;other (see comments) backwards  -LR      [place] Level/Cues Needed (Stairs Goal 1, PT)  contact guard assist  -LR      Number of Stairs (Stairs Goal 1, PT)  1  -LR      Time Frame (Stairs Goal 1, PT)  long term goal (LTG);5 days  -LR      Progress/Outcome (Stairs Goal 1, PT)  goal not met;discharged from facility  -LR        User Key  (r) = Recorded By, (t) = Taken By, (c) = Cosigned By    Initials Name Provider Type Discipline    LR Chelsea Mendes, PT Physical Therapist PT        Therapy Treatment  Rehabilitation Treatment Summary     Row Name 01/16/19 1129             Treatment Time/Intention    Discipline  physical therapist  -LR      Document Type  therapy note (daily note);discharge evaluation/summary  -LR      Subjective Information  complains of;numbness numbness L knee/lower leg d/t nerve catheter  -LR      Mode of Treatment  physical therapy;individual therapy  -LR      Patient/Family Observations  Patient sitting reclined [facility] on arrival with L lower leg/foot/ankle splinted and elevated. L popliteal nerve catheter.   -LR      Care Plan Review  care plan/treatment goals reviewed;risks/benefits reviewed;current/potential barriers reviewed;patient/other agree to care plan  -LR      Care Plan Review, Other Participant(s)  spouse  -LR      Patient Effort  excellent  -LR      Existing Precautions/Restrictions  fall;non-weight bearing;left;other (see comments) NWB L LE, L popliteal nerve  catheter  -LR      Recorded by [LR] Chelsea Mendes, PT 01/16/19 1159      Row Name 01/16/19 1129             Cognitive Assessment/Intervention- PT/OT    Orientation Status (Cognition)  oriented x 4  -LR      Follows Commands (Cognition)  WFL;follows one step commands;over 90% accuracy;verbal cues/prompting required;repetition of directions required  -LR      Safety Deficit (Cognitive)  mild deficit;safety precautions awareness;safety precautions follow-through/compliance  -LR      Recorded by [LR] Chelsea Mendes, PT 01/16/19 1159      Row Name 01/16/19 1129             Safety Issues, Functional Mobility    Safety Issues Affecting Function (Mobility)  safety precaution awareness;safety precautions follow-through/compliance  -LR      Recorded by [LR] Chelsea Mendes, PT 01/16/19 1159      Row Name 01/16/19 1129             Mobility Assessment/Intervention    Extremity Weight-bearing Status  left lower extremity  -LR      Left Lower Extremity (Weight-bearing Status)  non weight-bearing (NWB)  (Significant)   -LR      Recorded by [LR] Chelsea Mendes, PT 01/16/19 1159      Row Name 01/16/19 1129             Bed Mobility Assessment/Treatment    Supine-Sit [place] (Bed Mobility)  not tested [facility] on arrival.   -LR      Comment (Bed Mobility)  [facility] at end of treatment.   -LR      Recorded by [LR] Chelsea Mendes, PT 01/16/19 1159      Row Name 01/16/19 1129             Transfer Assessment/Treatment    Transfer Assessment/Treatment  stand-sit transfer;sit-stand transfer;toilet transfer  -LR      Maintains Weight-bearing Status (Transfers)  able to maintain;verbal cues to maintain  -LR      Comment (Transfers)  Verbal cues for correct hand placement with t/f, for correct positioning of knee walker, correct stand pivot t/f on and off rolling knee walker, and to lock brakes before t/f on and off rolling knee walker. Verbal cues to approach chair or commode on R to safely pivot off of  rolling knee walker.   -LR      Recorded by [LR] Chelsea Mendes, PT 01/16/19 1159      Row Name 01/16/19 1129             Sit-Stand Transfer    Sit-Stand [place] (Transfers)  verbal cues;contact guard  -LR      Assistive Device (Sit-Stand Transfers)  walker, knee scooter  -LR      Recorded by [LR] Chelsea Mendes, PT 01/16/19 1159      Row Name 01/16/19 1129             Stand-Sit Transfer    Stand-Sit [place] (Transfers)  verbal cues;contact guard  -LR      Assistive Device (Stand-Sit Transfers)  walker, knee scooter  -LR      Recorded by [LR] Chelsea Mendes, PT 01/16/19 1159      Row Name 01/16/19 1129             Toilet Transfer    Type (Toilet Transfer)  sit-stand;stand-sit  -LR      [place] Level (Toilet Transfer)  verbal cues;contact guard  -LR      Assistive Device (Toilet Transfer)  grab bars/safety frame;walker, knee scooter;raised toilet seat  -LR      Recorded by [LR] Chelsea Mendes, PT 01/16/19 1159      Row Name 01/16/19 1129             Gait/Stairs Assessment/Training    76937 - Gait Training Minutes   23  -LR      [place] Level (Gait)  verbal cues;contact guard  -LR      Assistive Device (Gait)  walker, knee scooter  -LR      Distance in Feet (Gait)  230  -LR      Pattern (Gait)  swing-through  -LR      Maintains Weight-bearing Status (Gait)  able to maintain  -LR      [place] Level (Stairs)  verbal cues;minimum assist (75% patient effort)  -LR      Assistive Device (Stairs)  walker, front-wheeled  -LR      Handrail Location (Stairs)  none  -LR      Number of Steps (Stairs)  2  -LR      Ascending Technique (Stairs)  step-to-step  -LR      Descending Technique (Stairs)  step-to-step  -LR      Maintains Weight-bearing Status (Stairs)  able to maintain  -LR      Stairs, Impairments  strength decreased  -LR      Comment (Gait/Stairs)  Patient ambulated with swing through gait pattern on rolling knee walker. Patient c/o numbness in L knee and required  cues and assist to get L knee in correct position on pad. Educated patient and her  to look at her knee frequently when up on rolling knee walker because she may not be able to feel it sliding off pad if it does. Gait limited by fatigue. Verbal cues to back up to step with RW and to use RW for UE support. Verbal cues to hop up step backwards on R LE. Cues to then lean all her weight on RW and to hop down step on R LE. Performed twice.   -LR      Recorded by [LR] Chelsea Mendes, PT 01/16/19 1159      Row Name 01/16/19 1129             Positioning and Restraints    Pre-Treatment Position  sitting in chair/recliner  -LR      Post Treatment Position  chair  -LR      In Chair  notified nsg;reclined;sitting;call light within reach;encouraged to call for assist;with family/caregiver;legs elevated;LLE elevated  -LR      Recorded by [LR] Chelsea Mendes, PT 01/16/19 1159      Row Name 01/16/19 1129             Pain Assessment    Additional Documentation  Pain Scale: Numbers Pre/Post-Treatment (Group)  -LR      Recorded by [LR] Chelsea Mendes, PT 01/16/19 1159      Row Name 01/16/19 1129             Pain Scale: Numbers Pre/Post-Treatment    Pain Scale: Numbers, Pretreatment  0/10 - no pain  -LR      Pain Scale: Numbers, Post-Treatment  0/10 - no pain  -LR      Recorded by [LR] Chelsea Mendes, PT 01/16/19 1159      Row Name                Wound 01/15/19 0900 Left ankle incision    Wound - Properties Group Date first assessed: 01/15/19 [KS] Time first assessed: 0900 [KS] Side: Left [KS] Location: ankle [KS] Type: incision [KS] Recorded by:  [KS] Rosa Gale RN 01/15/19 0900    Row Name 01/16/19 1129             Coping    Observed Emotional State  accepting;cooperative  -LR      Verbalized Emotional State  acceptance  -LR      Recorded by [LR] Chelsea Mendes, PT 01/16/19 1159      Row Name 01/16/19 1129             Plan of Care Review    Plan of Care Reviewed With   patient;spouse  -LR      Recorded by [LR] MendesCruz simentalglenna Randhawa, PT 01/16/19 1159      Row Name 01/16/19 1129             Outcome Summary/Treatment Plan (PT)    Daily Summary of Progress (PT)  progress toward functional goals as expected  -LR      Recorded by [LR] Chelsea Mendeshryn, PT 01/16/19 1159        User Key  (r) = Recorded By, (t) = Taken By, (c) = Cosigned By    Initials Name Effective Dates Discipline    LR MendesChelsea, PT 06/19/15 -  PT    Rosa Arizmendi RN 06/16/16 -  Nurse        Wound 01/15/19 0900 Left ankle incision (Active)   Dressing Appearance dry;intact 1/16/2019  8:20 AM   Drainage Amount none 1/16/2019  8:20 AM   Dressing Care, Wound elastic bandage 1/16/2019  8:20 AM       PT Recommendation and Plan  Anticipated Discharge Disposition (PT): home with assist  Planned Therapy Interventions (PT Eval): balance training, bed mobility training, gait training, home exercise program, patient/family education, stair training, ROM (range of motion), strengthening, transfer training  Therapy Frequency (PT Clinical Impression): daily  Outcome Summary/Treatment Plan (PT)  Daily Summary of Progress (PT): progress toward functional goals as expected  Anticipated Equipment Needs at Discharge (PT): rolling knee walker  Anticipated Discharge Disposition (PT): home with assist  Plan of Care Reviewed With: patient, spouse  Progress: improving  Outcome Summary: Patient ambulated 230 feet with rolling knee walker with no difficulty, limited by fatigue. Patient demonstrated hopping up one step backward using RW for UE support, requiring slight assist at gait belt to do so. Patient has been d/c home with family.    Outcome Measures     Row Name 01/16/19 1129 01/15/19 1447          How much help from another person do you currently need...    Turning from your back to your side while in flat bed without using bedrails?  4  -LR  4  -LR     Moving from lying on back to sitting on the side of a flat  bed without bedrails?  4  -LR  3  -LR     Moving to and from a bed to a chair (including a wheelchair)?  3  -LR  3  -LR     Standing up from a chair using your arms (e.g., wheelchair, bedside chair)?  3  -LR  3  -LR     Climbing 3-5 steps with a railing?  2  -LR  2  -LR     To walk in hospital room?  3  -LR  3  -LR     AM-PAC 6 Clicks Score  19  -LR  18  -LR        Functional Assessment    Outcome Measure Options  AM-PAC 6 Clicks Basic Mobility (PT)  -LR  AM-PAC 6 Clicks Basic Mobility (PT)  -LR       User Key  (r) = Recorded By, (t) = Taken By, (c) = Cosigned By    Initials Name Provider Type    Chelsea Pearson, PT Physical Therapist           Time Calculation:   PT Charges     Row Name 01/16/19 1129             Time Calculation    Start Time  1129  -LR      PT Received On  01/16/19  -LR      PT Goal Re-Cert Due Date  01/25/19  -LR         Time Calculation- PT    Total Timed Code Minutes- PT  23 minute(s)  -LR         Timed Charges    07040 - Gait Training Minutes   23  -LR        User Key  (r) = Recorded By, (t) = Taken By, (c) = Cosigned By    Initials Name Provider Type    Chelsea Pearson, PT Physical Therapist        Therapy Suggested Charges     Code   Minutes Charges    27033 (CPT®) Hc Pt Neuromusc Re Education Ea 15 Min      13356 (CPT®) Hc Pt Ther Proc Ea 15 Min      97577 (CPT®) Hc Gait Training Ea 15 Min 23 2    67321 (CPT®) Hc Pt Therapeutic Act Ea 15 Min      28646 (CPT®) Hc Pt Manual Therapy Ea 15 Min      63237 (CPT®) Hc Pt Iontophoresis Ea 15 Min      31525 (CPT®) Hc Pt Elec Stim Ea-Per 15 Min      57548 (CPT®) Hc Pt Ultrasound Ea 15 Min      82618 (CPT®) Hc Pt Self Care/Mgmt/Train Ea 15 Min      18561 (CPT®) Hc Pt Prosthetic (S) Train Initial Encounter, Each 15 Min      85375 (CPT®) Hc Pt Orthotic(S)/Prosthetic(S) Encounter, Each 15 Min      18847 (CPT®) Hc Orthotic(S) Mgmt/Train Initial Encounter, Each 15min      Total  23 2          Therapy Charges for Today     Code  Description Service Date Service Provider Modifiers Qty    54783763850  PT THERAPEUTIC ACT EA 15 MIN 1/15/2019 Chelsea Mendes, PT GP 1    51835687450 HC PT THER SUPP EA 15 MIN 1/15/2019 Chelsea Mendes, PT GP 2    27191424528 HC PT EVAL LOW COMPLEXITY 3 1/15/2019 Chelsea Mendes, PT GP 1    72790765767 HC GAIT TRAINING EA 15 MIN 1/16/2019 Chelsea Mendes, PT GP 2    84126104485 HC PT THER SUPP EA 15 MIN 1/16/2019 Chelsea Mendes, PT GP 2          PT G-Codes  Outcome Measure Options: AM-PAC 6 Clicks Basic Mobility (PT)  AM-PAC 6 Clicks Score: 19    PT Discharge Summary  Anticipated Discharge Disposition (PT): home with assist  Reason for Discharge: Discharge from facility  Outcomes Achieved: Patient able to partially acheive established goals  Discharge Destination: Home with assist    Chelsea Mendes, PT  1/16/2019

## 2019-01-16 NOTE — PLAN OF CARE
Problem: Patient Care Overview  Goal: Plan of Care Review  Outcome: Ongoing (interventions implemented as appropriate)   01/16/19 0514   Plan of Care Review   Progress no change   OTHER   Outcome Summary pt rested well, pain minimized with PRN medications, arrow pump infusing at 6ML/HR, VSS, voids well, will continue to monitor, expected DC today,.    Coping/Psychosocial   Plan of Care Reviewed With patient       Problem: Pain, Acute (Adult)  Goal: Identify Related Risk Factors and Signs and Symptoms  Outcome: Ongoing (interventions implemented as appropriate)      Problem: Fracture Orthopaedic (Adult)  Goal: Signs and Symptoms of Listed Potential Problems Will be Absent, Minimized or Managed (Fracture Orthopaedic)  Outcome: Ongoing (interventions implemented as appropriate)      Problem: Fall Risk (Adult)  Goal: Identify Related Risk Factors and Signs and Symptoms  Outcome: Ongoing (interventions implemented as appropriate)

## 2019-01-16 NOTE — NURSING NOTE
Acute Pain Service:  Peripheral nerve catheter and disposable infusion device teaching completed with patient and spouse.  Discussion, handout and bracelet provided with [facility] on call central phone number.  Instructed to call with any questions or concerns.  Patient verbalized understanding.  Service will continue to follow until catheter DC'd.  Please contact patient at [phone] if needed.

## 2019-01-16 NOTE — PLAN OF CARE
Problem: Patient Care Overview  Goal: Plan of Care Review  Outcome: Ongoing (interventions implemented as appropriate)   01/16/19 1129   Plan of Care Review   Progress improving   OTHER   Outcome Summary Patient ambulated 230 feet with rolling knee walker with no difficulty, limited by fatigue. Patient demonstrated hopping up one step backward using RW for UE support, requiring slight assist at gait belt to do so. Patient has been d/c home with family.   Coping/Psychosocial   Plan of Care Reviewed With patient;spouse

## 2019-01-16 NOTE — PROGRESS NOTES
Orthopedic  Progress Note    Subjective     Post-Operative Day: 1 Day Post-Op    Systemic or Specific Complaints: s/p ORIF left ankle, good pain control  Objective     Vital signs in last 24 hours:  Temp:  [97 °F (36.1 °C)-98.9 °F (37.2 °C)] 98.8 °F (37.1 °C)  Heart Rate:  [72-94] 80  Resp:  [15-20] 18  BP: (121-160)/(60-96) 134/81    Neurovascular: Toes pink, decrease motor and sensory due to block   Wound: Splint dry         Data Review  Lab Results (last 24 hours)     Procedure Component Value Units Date/Time    POC Glucose Once [[id number]]  (Normal) Collected:  01/16/19 0732    Specimen:  Blood Updated:  01/16/19 0748     Glucose 123 mg/dL     POC Glucose Once [[id number]]  (Abnormal) Collected:  01/15/19 1943    Specimen:  Blood Updated:  01/15/19 1955     Glucose 207 mg/dL     POC Glucose Once [[id number]]  (Abnormal) Collected:  01/15/19 1707    Specimen:  Blood Updated:  01/15/19 1707     Glucose 166 mg/dL     POC Glucose Once [[id number]]  (Abnormal) Collected:  01/15/19 1022    Specimen:  Blood Updated:  01/15/19 1026     Glucose 134 mg/dL             Assessment/Plan     Status post- stable s/p ORIF ankle fx, ok to go home after diabetes education and PT, 2 weeks lovenox, follow up 2 weeks or sooner if any problems           Sherry Faust MD    Date: 1/16/2019  Time: 8:56 AM

## 2019-01-16 NOTE — DISCHARGE SUMMARY
Patient Name: Angelica Stewart  MRN: [id number]  : 1960  DOS: 2019    Attending: Sherry Hong MD    Primary Care Provider: Tacho Mendoza MD    Date of Admission:.1/15/2019  5:13 AM    Date of Discharge:  2019    Discharge Diagnosis:   S/P ORIF left bimalleolar ankle fracture    Ankle fracture, bimalleolar, closed, left, initial encounter    HTN (hypertension)    DM (diabetes mellitus) (CMS/[facility])    Acute postoperative pain      Hospital Course  Patient is a 58 y.o. female presented for scheduled surgery by Dr. Hong.  About a week ago she fell getting out of bed causing an injury to her left ankle. She was seen at an outside hospital where her ankle was found to have fracture with dislocation.  This was reduced and splinted.  Her fracture is comminuted.  She underwent open reduction internal fixation of left bimalleolar ankle fracture and open reduction internal fixation of left syndesmotic disruption.  Procedure was done under general anesthesia with block.  It was tolerated well.     Her preoperative workup showed her to have elevated hemoglobin A1c consistent with diabetes mellitus type 2.  This was communicated with Dr. Mendoza who started her on Janumet.  She has previously had constipation that was treated and improved with Linzess.  She has history of fibromyalgia for which he is on a regimen of Neurontin, meloxicam, and Cymbalta.    Patient was provided pain medications as needed for pain control, along with popliteal nerve block infusion of Ropivacaine.    Adjustments were made to pain medications to optimize postop pain management. Risks and benefits of opiate medications discussed with patient.    She was seen by PT has progressed well over her stay.  She used an IS for atelectasis prophylaxis and Lovenox along with mechanicals for DVT prophylaxis.  Home medications were resumed as appropriate, and labs were monitored and remained fairly stable.     With the  "progress she has made, she is ready for DC home today.    Keep splint clean and dry until follow up appointment with Dr. Hong.  She will have an On Q pump ( instructed on it during this admit)  Discussed with patient regarding plan and she shows understanding and agreement.          Procedures Performed  01/15/19 9:41 AM     Preoperative diagnosis: Comminuted left bimalleolar ankle fracture with syndesmotic disruption     Postoperative diagnosis: Same     Anesthesia: Gen. with blocks for postop pain control     Surgeon: Sherry Hong M.D.     Operative procedure: 1 open reduction internal fixation left bimalleolar ankle fracture 2.  Open reduction internal fixation left syndesmotic disruption      Pertinent Test Results:    I reviewed the patient's new clinical results.         Invalid input(s): NEUTOPHILPCT,  EOSPCT  Results from last 7 days   Lab Units 01/15/19  0630   POTASSIUM mmol/L 4.1     Results for HUA JUAN (MRN [id number]) as of 2019 12:38   Ref. Range 1/15/2019 10:22 1/15/2019 17:07 1/15/2019 19:43 2019 07:32   Glucose Latest Ref Range: 70 - 130 mg/dL 134 (H) 166 (H) 207 (H) 123     I reviewed the patient's new imaging including images and reports.      Physical therapy: Patient ambulated 230 feet with rolling knee walker with no difficulty, limited by fatigue. Patient demonstrated hopping up one step backward using RW for UE support, requiring slight assist at gait belt to do so. Patient has been d/c home with family.    Discharge Assessment:    Vital Signs  Visit Vitals  /77 (BP Location: Right arm, Patient Position: Sitting)   Pulse 71   Temp 98.1 °F (36.7 °C) (Oral)   Resp 16   Ht 160 cm (63\")   Wt 99.8 kg (220 lb)   LMP  (LMP Unknown) Comment:  FOR 7YRS   SpO2 94%   BMI 38.97 kg/m²     Temp (24hrs), Av.3 °F (36.8 °C), Min:97.3 °F (36.3 °C), Max:98.9 °F (37.2 °C)      General Appearance:    Alert, cooperative, in no acute distress   Lungs:     Clear to " auscultation,respirations regular, even and                   unlabored    Heart:    Regular rhythm and normal rate, normal S1 and S2   Abdomen:     Normal bowel sounds, no masses, no organomegaly, soft        non-tender, non-distended, no guarding, no rebound                 tenderness   Extremities:   Left ankle with CDI dressing, ACE over splint. Distal toes with normal cap refill. Pop block cath present.   Pulses:   Pulses palpable and equal bilaterally   Skin:   No bleeding, bruising or rash   Neurologic:   Cranial nerves 2 - 12 grossly intact, sensation intact       Discharge Disposition: Home    Discharge Medications     Discharge Medications      New Medications      Instructions Start Date   enoxaparin 40 MG/0.4ML solution syringe  Commonly known as:  LOVENOX   40 mg, Subcutaneous, Daily, For 2 weeks      STOOL SOFTENER 100 MG capsule  Generic drug:  docusate sodium   100 mg, Oral, 2 Times Daily         Changes to Medications      Instructions Start Date   oxyCODONE-acetaminophen 5-325 MG per tablet  Commonly known as:  PERCOCET  What changed:  Another medication with the same name was removed. Continue taking this medication, and follow the directions you see here.   1 tablet, Oral, Every 4 Hours PRN         Continue These Medications      Instructions Start Date   DULoxetine 60 MG capsule  Commonly known as:  CYMBALTA   60 mg, Oral, Daily      gabapentin 100 MG capsule  Commonly known as:  NEURONTIN   100 mg, Oral, 3 Times Daily      HYDROcodone-acetaminophen 7.5-325 MG per tablet  Commonly known as:  NORCO   1-2 tablets, Oral, Every 6 Hours PRN      JANUMET PO   1 tablet, Oral, Daily      lansoprazole 30 MG capsule  Commonly known as:  PREVACID   30 mg, Oral, 2 Times Daily      LINZESS 72 MCG capsule capsule  Generic drug:  linaclotide   72 mcg, Oral, Nightly      meloxicam 15 MG tablet  Commonly known as:  MOBIC   15 mg, Oral, Daily      metoprolol succinate XL 25 MG 24 hr tablet  Commonly known as:   TOPROL-XL   50 mg, Oral, Daily      ondansetron 4 MG tablet  Commonly known as:  ZOFRAN   4 mg, Oral, Every 6 Hours PRN      SUMAtriptan 100 MG tablet  Commonly known as:  IMITREX   100 mg, Oral, Daily PRN         Stop These Medications    ibuprofen 800 MG tablet  Commonly known as:  ADVIL,MOTRIN            Discharge Diet: Consistent carb diet    Activity at Discharge: DANI DAVIES    Follow-up Appointments  Dr. Hong per her orders      MONE Powers  01/16/19  12:41 PM

## 2019-01-17 NOTE — PROGRESS NOTES
IVAN Forman    Nerve Cath Post Op Call    Patient Name: Angelica Stewart  :  1960  MRN:  [id number]  Date of Discharge: 2019    Nerve Cath Post Op Call:    Analgesia:Excellent  Pain Score:0/10  Side Effects:None  Catheter Site:clean  Patient Controlled ON Q pump infusion rate: 8ml/hr  Catheter Plan:Will continue with plan at home without changes and The patient was instructed to call ON CALL Anesthesia provider for any questions or problems

## 2019-01-18 ENCOUNTER — TELEPHONE (OUTPATIENT)
Dept: ORTHOPEDIC SURGERY | Facility: CLINIC | Age: 59
End: 2019-01-18

## 2019-01-18 NOTE — PROGRESS NOTES
IVAN Forman    Nerve Cath Post Op Call    Patient Name: Angelica Stewart  :  1960  MRN:  [id number]  Date of Discharge: 2019    Nerve Cath Post Op Call:    Catheter Plan:The patient was instructed to call ON CALL Anesthesia provider for any questions or problems and Patient called/No answer/Message left to call [facility] pain service for any questions or complaints

## 2019-01-18 NOTE — TELEPHONE ENCOUNTER
Patient had surgery on 1/15/2019 on her left ankle. She is currently in a splint and had a couple questions about if she should keep her foot completely still or if she can move her toes and foot. She can be reached at [phone].

## 2019-01-18 NOTE — TELEPHONE ENCOUNTER
I called Angelica and explained that her swelling has probably gone down and that is why she is getting so much movement. I recommended she take the ace wraps off and re-wrap them on the leg more snug. She understood and will try this and call us back if she has any other questions.  Chelsi

## 2019-01-20 NOTE — PROGRESS NOTES
IVAN Forman    Nerve Cath Post Op Call    Patient Name: Angelica Stewart  :  1960  MRN:  [id number]  Date of Discharge: 2019    Nerve Cath Post Op Call:    Analgesia:Good  Catheter Plan:Patient/Family member report nerve catheter previously discontinued, tip intact

## 2019-01-24 ENCOUNTER — TELEPHONE (OUTPATIENT)
Dept: ORTHOPEDIC SURGERY | Facility: CLINIC | Age: 59
End: 2019-01-24

## 2019-01-28 ENCOUNTER — TELEPHONE (OUTPATIENT)
Dept: ORTHOPEDIC SURGERY | Facility: CLINIC | Age: 59
End: 2019-01-28

## 2019-01-28 ENCOUNTER — OFFICE VISIT (OUTPATIENT)
Dept: ORTHOPEDIC SURGERY | Facility: CLINIC | Age: 59
End: 2019-01-28

## 2019-01-28 DIAGNOSIS — Z98.890 S/P ORIF (OPEN REDUCTION INTERNAL FIXATION) FRACTURE: Primary | ICD-10-CM

## 2019-01-28 DIAGNOSIS — Z87.81 S/P ORIF (OPEN REDUCTION INTERNAL FIXATION) FRACTURE: Primary | ICD-10-CM

## 2019-01-28 DIAGNOSIS — E11.9 CONTROLLED TYPE 2 DIABETES MELLITUS WITHOUT COMPLICATION, WITHOUT LONG-TERM CURRENT USE OF INSULIN (HCC): ICD-10-CM

## 2019-01-28 PROCEDURE — 29405 APPL SHORT LEG CAST: CPT | Performed by: ORTHOPAEDIC SURGERY

## 2019-01-28 PROCEDURE — 99024 POSTOP FOLLOW-UP VISIT: CPT | Performed by: ORTHOPAEDIC SURGERY

## 2019-01-28 NOTE — PROGRESS NOTES
Post-op (2 weeks s/p ORIF (L) ankle fracture 01/15/2019)      Angelica Stewart is 2 weeks status post ORIF left ankle fx with syndesmosis, 1/15/19. She reports no fever, chills.  She reports pain is well controlled.  They have been taking lovenox for DVT prophylaxis.  They have been NWB in splint.  New diagnosis of diabetes at the time of surgyer    Past Surgical History:   Procedure Laterality Date   • ANKLE OPEN REDUCTION INTERNAL FIXATION Left 1/15/2019    Procedure: ORIF LEFT ANKLE FRACTURE;  Surgeon: Sherry Faust MD;  Location:  [facility];  Service: Orthopedics   • BREAST BIOPSY     • BREAST SURGERY Bilateral     REPORTS BILATERAL BIOPSIES WITH TUMOR MARKER PLACEMENT   • COLONOSCOPY N/A 9/25/2017    Procedure: COLONOSCOPY;  Surgeon: Manisha Potter MD;  Location:  [facility];  Service:    • ENDOSCOPY     • MOUTH SURGERY      REPORTS FULL MOUTH EXTRACTION AT AGE 14   • OTHER SURGICAL HISTORY  2007    REPORTS CYSTS REMOVED FROM THROAT       LMP  (LMP Unknown) Comment:  FOR 7YRS        No erythema, no drainage, no sign of infection, normal post op swelling. Left ankle, n-v intact    ordered and reviewed x-rays today    Assessment and Plan:   1. S/P ORIF (open reduction internal fixation) fracture  We removed sutures, put her in non-wt bearing cast, short leg fiberglass.  She will return in 4 weeks for xray out of cast with Ms Bowers, if it looks good she may go into a non-wt bearing tall boot and have several PT visits for ROM.  Should wear support stocking under boot. She must be non-wt bearing total 12 weeks.  She should see me 6 weeks after visit with Ms Bowers for xray again  - XR Ankle 3+ View Left    2. Controlled type 2 diabetes mellitus without complication, without long-term current use of insulin (CMS/[facility])  She reports good glucose control

## 2019-01-28 NOTE — TELEPHONE ENCOUNTER
PT BROUGHT IN [facility] PAPERS TO BE FILLED OUT. SHE WOULD LIKE FORMS MAILED TO HER WHEN COMPLETED. HAS BEEN PAID.

## 2019-02-27 ENCOUNTER — OFFICE VISIT (OUTPATIENT)
Dept: ORTHOPEDIC SURGERY | Facility: CLINIC | Age: 59
End: 2019-02-27

## 2019-02-27 DIAGNOSIS — Z98.890 STATUS POST ORIF OF FRACTURE OF ANKLE: Primary | ICD-10-CM

## 2019-02-27 DIAGNOSIS — Z87.81 STATUS POST ORIF OF FRACTURE OF ANKLE: Primary | ICD-10-CM

## 2019-02-27 PROCEDURE — 99024 POSTOP FOLLOW-UP VISIT: CPT | Performed by: PHYSICIAN ASSISTANT

## 2019-02-27 NOTE — PROGRESS NOTES
[facility] Note    Subjective     Patient: Angelica Stewart  : 1960    Primary Care Provider: Tacho Mendoza MD    Requesting Provider: As above    Post-op Follow-up (4 week follow up - 6 weeks status post ORIF left ankle fracture 01/15/19)      History    History of Present Illness: Patient presents today 6 weeks status post ORIF left ankle fracture with Dr. Faust on 1/15/19.  Patient has been nonweightbearing in a cast as instructed.  She is not having any pain.  No new symptoms.    Current Outpatient Medications on File Prior to Visit   Medication Sig Dispense Refill   • docusate sodium (COLACE) 100 MG capsule Take 1 capsule by mouth 2 (Two) Times a Day. 60 capsule 0   • DULoxetine (CYMBALTA) 60 MG capsule Take 60 mg by mouth Daily.  1   • enoxaparin (LOVENOX) 40 MG/0.4ML solution syringe Inject 0.4 mL under the skin into the appropriate area as directed Daily. For 2 weeks 5.6 mL 0   • gabapentin (NEURONTIN) 100 MG capsule Take 100 mg by mouth 3 (Three) Times a Day.  0   • HYDROcodone-acetaminophen (NORCO) 7.5-325 MG per tablet Take 1-2 tablets by mouth Every 6 (Six) Hours As Needed for Moderate post surgical pain. 60 tablet 0   • lansoprazole (PREVACID) 30 MG capsule Take 30 mg by mouth 2 (Two) Times a Day.  1   • linaclotide (LINZESS) 72 MCG capsule capsule Take 72 mcg by mouth Every Night.     • meloxicam (MOBIC) 15 MG tablet Take 15 mg by mouth Daily.  1   • metoprolol succinate XL (TOPROL-XL) 25 MG 24 hr tablet Take 50 mg by mouth Daily.  1   • ondansetron (ZOFRAN) 4 MG tablet Take 1 tablet by mouth Every 6 (Six) Hours As Needed for Nausea or Vomiting. 30 tablet 0   • oxyCODONE-acetaminophen (PERCOCET) 5-325 MG per tablet Take 1 tablet by mouth Every 4 (Four) Hours As Needed.     • SITagliptin-MetFORMIN HCl (JANUMET PO) Take 1 tablet by mouth Daily.     • SUMAtriptan (IMITREX) 100 MG tablet Take 100 mg by mouth Daily As Needed for Migraine.  1     No  "current facility-administered medications on file prior to visit.       Allergies   Allergen Reactions   • Penicillins Other (See Comments)     UNSURE REACTION, REPORTS WAS TOLD THIS WAS AN ALLERGY BY HER MOTHER.   • Celecoxib Rash     RASH       Past Medical History:   Diagnosis Date   • Acid reflux    • Anal fistula    • Arthritis    • Body piercing     EARS   • Fibromyalgia     PATIENT REPORTS \"IT'S SELF DIAGNOSED\"   • Full dentures     INSTRUCTED NO ADHESIVES DOS   • Hypertension    • Migraines    • Migraines    • PONV (postoperative nausea and vomiting)    • Rectocele    • Wears contact lenses      Past Surgical History:   Procedure Laterality Date   • ANKLE OPEN REDUCTION INTERNAL FIXATION Left 1/15/2019    Procedure: ORIF LEFT ANKLE FRACTURE;  Surgeon: Sherry Faust MD;  Location: [facility];  Service: Orthopedics   • BREAST BIOPSY     • BREAST SURGERY Bilateral     REPORTS BILATERAL BIOPSIES WITH TUMOR MARKER PLACEMENT   • COLONOSCOPY N/A 9/25/2017    Procedure: COLONOSCOPY;  Surgeon: Manisha Potter MD;  Location: [facility] ENDOSCOPY;  Service:    • ENDOSCOPY     • MOUTH SURGERY      REPORTS FULL MOUTH EXTRACTION AT AGE 14   • OTHER SURGICAL HISTORY  2007    REPORTS CYSTS REMOVED FROM THROAT     Family History   Problem Relation Age of Onset   • Breast cancer Mother 80   • Cancer Mother    • Collagen disease Mother    • Hypertension Mother    • Cancer Father         colon   • Heart attack Father    • Ovarian cancer Neg Hx       Social History     Socioeconomic History   • Marital status:      Spouse name: Not on file   • Number of children: Not on file   • Years of education: Not on file   • Highest education level: Not on file   Social Needs   • Financial resource strain: Not on file   • Food insecurity - worry: Not on file   • Food insecurity - inability: Not on file   • Transportation needs - medical: Not on file   • Transportation needs - non-medical: Not on file   Occupational History   • Not on " file   Tobacco Use   • Smoking status: Never Smoker   • Smokeless tobacco: Never Used   Substance and Sexual Activity   • Alcohol use: No   • Drug use: No   • Sexual activity: Defer     Comment:    Other Topics Concern   • Not on file   Social History Narrative   • Not on file        Review of Systems    The following portions of the patient's history were reviewed and updated as appropriate: allergies, current medications, past family history, past medical history, past social history, past surgical history and problem list.      Objective      Physical Exam  LMP  (LMP Unknown) Comment:  FOR 7YRS    There is no height or weight on file to calculate BMI.    Ortho Exam  Left ankle exam:  Incisions well-healed  Moderate postop swelling  Significant swelling in the forefoot  Neurovascularly intact with pulses 2+    Medical Decision Making    Data Review:   ordered and reviewed x-rays today    Assessment:  1. Status post ORIF of fracture of ankle        Plan:  Doing well status post ORIF left ankle fracture with Dr. Faust.  X-rays today show excellent alignment of the fracture with good healing and intact hardware.  Plan today is the patient go into a tall boot.  She must remain nonweightbearing.  I encouraged her to try to elevate the foot more.  I will put in an order for PT for several visits to work on motion. She will return to see Dr. Faust in 6 weeks with repeat x-rays or sooner if needed.      Layla Harmon MA  02/27/19  1:06 PM

## 2019-04-10 ENCOUNTER — OFFICE VISIT (OUTPATIENT)
Dept: ORTHOPEDIC SURGERY | Facility: CLINIC | Age: 59
End: 2019-04-10

## 2019-04-10 ENCOUNTER — TELEPHONE (OUTPATIENT)
Dept: ORTHOPEDIC SURGERY | Facility: CLINIC | Age: 59
End: 2019-04-10

## 2019-04-10 DIAGNOSIS — E11.9 CONTROLLED TYPE 2 DIABETES MELLITUS WITHOUT COMPLICATION, WITHOUT LONG-TERM CURRENT USE OF INSULIN (HCC): ICD-10-CM

## 2019-04-10 DIAGNOSIS — Z87.81 STATUS POST ORIF OF FRACTURE OF ANKLE: Primary | ICD-10-CM

## 2019-04-10 DIAGNOSIS — Z98.890 STATUS POST ORIF OF FRACTURE OF ANKLE: Primary | ICD-10-CM

## 2019-04-10 PROCEDURE — 99024 POSTOP FOLLOW-UP VISIT: CPT | Performed by: ORTHOPAEDIC SURGERY

## 2019-04-10 NOTE — PROGRESS NOTES
Post-op Follow-up (6 week f/u , status post ORIF left ankle fracture 01/15/19)      Angelica Stewart is 3 months status post ORIF left ankle fracture with syndesmotic fixation, 1/15/2019. She reports no fever, chills.  She reports pain is improving .  They have been taking aspirin for DVT prophylaxis.  They have been NWB in boot.      Past Surgical History:   Procedure Laterality Date   • ANKLE OPEN REDUCTION INTERNAL FIXATION Left 1/15/2019    Procedure: ORIF LEFT ANKLE FRACTURE;  Surgeon: Sherry Faust MD;  Location:  [facility];  Service: Orthopedics   • BREAST BIOPSY     • BREAST SURGERY Bilateral     REPORTS BILATERAL BIOPSIES WITH TUMOR MARKER PLACEMENT   • COLONOSCOPY N/A 9/25/2017    Procedure: COLONOSCOPY;  Surgeon: Manisha Potter MD;  Location:  [facility];  Service:    • ENDOSCOPY     • MOUTH SURGERY      REPORTS FULL MOUTH EXTRACTION AT AGE 14   • OTHER SURGICAL HISTORY  2007    REPORTS CYSTS REMOVED FROM THROAT       LMP  (LMP Unknown) Comment:  FOR 7YRS        No erythema, no drainage, no sign of infection, normal post op swelling.  Left ankle incisions are healed, good alignment, 10 degrees dorsiflexion, 30 degrees plantar flexion, no tenderness    ordered and reviewed x-rays today    Assessment and Plan:   1. Status post ORIF of fracture of ankle  She is improving steadily.  She reports good glucose control.  We found she had diabetes at the time of surgery.  She may now begin weightbearing in the boot and go back to physical therapy.  After about 4 weeks they may help her wean out of the boot.  I wrote this in the prescription for physical therapy.  We also discussed the syndesmotic screw.  I explained that for most people over 50 I would leave the screw in situ, I explained that if it breaks its merely an x-ray finding it is not a clinical problem.  She and her  understand and they agree.  She would like to avoid further surgery.  I will see her again in 3 months with standing  2 views of the left ankle  - XR Ankle 2 View Left

## 2019-04-10 NOTE — TELEPHONE ENCOUNTER
PT DROPPED OFF ADDITIONAL PAPERWORK AND PAID FEE. S    SHE WOULD ALSO LIKE A CALL REGARDING EXISTING [facility] PAPERWORK WHEN POSSIBLE.

## 2019-05-22 ENCOUNTER — OFFICE VISIT (OUTPATIENT)
Dept: ORTHOPEDIC SURGERY | Facility: CLINIC | Age: 59
End: 2019-05-22

## 2019-05-22 VITALS — HEIGHT: 63 IN | BODY MASS INDEX: 40.26 KG/M2 | WEIGHT: 227.2 LBS | HEART RATE: 121 BPM | OXYGEN SATURATION: 100 %

## 2019-05-22 DIAGNOSIS — E11.9 CONTROLLED TYPE 2 DIABETES MELLITUS WITHOUT COMPLICATION, WITHOUT LONG-TERM CURRENT USE OF INSULIN (HCC): ICD-10-CM

## 2019-05-22 DIAGNOSIS — I87.8 VENOUS STASIS: ICD-10-CM

## 2019-05-22 DIAGNOSIS — Z98.890 STATUS POST ORIF OF FRACTURE OF ANKLE: Primary | ICD-10-CM

## 2019-05-22 DIAGNOSIS — Z87.81 STATUS POST ORIF OF FRACTURE OF ANKLE: Primary | ICD-10-CM

## 2019-05-22 PROCEDURE — 99213 OFFICE O/P EST LOW 20 MIN: CPT | Performed by: ORTHOPAEDIC SURGERY

## 2019-05-22 NOTE — PROGRESS NOTES
"Follow-up (6 week follow up - 18 weeks status post ORIF left ankle fracture 01/15/19)      Angelica Stewart is 4 months status post ORIF left ankle fracture, 1/15/2019. She reports no fever, chills.  She reports pain is moderate, she is trying to be on it a great deal, she has significant swelling.  They have been taking off meds now for DVT prophylaxis.  They have been weight bearing as tolerated.  She reports she stopped going to physical therapy because she did not think they were doing much of anything.  She also thought that her range of motion was limited by swelling.  I explained that swelling will not prevent range of motion.  She needs to go back to therapy, I would recommend a different therapy office if she was not satisfied.  Neither she nor her  remember our discussion about support stockings.  It is imperative that she wear them every day.  We discussed again what type and where to find them.    Past Surgical History:   Procedure Laterality Date   • ANKLE OPEN REDUCTION INTERNAL FIXATION Left 1/15/2019    Procedure: ORIF LEFT ANKLE FRACTURE;  Surgeon: Sherry Faust MD;  Location: [facility] OR;  Service: Orthopedics   • BREAST BIOPSY     • BREAST SURGERY Bilateral     REPORTS BILATERAL BIOPSIES WITH TUMOR MARKER PLACEMENT   • COLONOSCOPY N/A 9/25/2017    Procedure: COLONOSCOPY;  Surgeon: Manisha Potter MD;  Location: [facility] ENDOSCOPY;  Service:    • ENDOSCOPY     • MOUTH SURGERY      REPORTS FULL MOUTH EXTRACTION AT AGE 14   • OTHER SURGICAL HISTORY  2007    REPORTS CYSTS REMOVED FROM THROAT       Pulse (!) 121   Ht 160 cm (62.99\")   Wt 103 kg (227 lb 3.2 oz)   LMP  (LMP Unknown) Comment:  FOR 7YRS  SpO2 100%   Breastfeeding? No   BMI 40.26 kg/m²         No erythema, no drainage, no sign of infection, normal post op swelling.  Left ankle incisions are healed, no focal tenderness.  She has diffuse edema from knees to toes bilaterally, left is worse than right, she has 10 degrees " dorsiflexion on the left side 10 on the right, plantarflexion 40 on the right and 30 on the left.  5 degrees inversion eversion left, 10 and 10 on the right.    ordered and reviewed x-rays today    Assessment and Plan:   1. Status post ORIF of fracture of ankle  Her range of motion is not symmetric with the other side as yet, I definitely recommend she go back to physical therapy.  I will see her 3 months with standing 3 views of the left ankle.  She also needs to decrease her activity.  Her description of the things she is doing are too much for this time and healing.  She and her  and I talked about it very clearly.  t    2. Controlled type 2 diabetes mellitus without complication, without long-term current use of insulin (CMS/[facility])  She reports good glucose control    3. Venous stasis  Neither she nor her  remember our discussion about support stockings.  It is very important that she begin wearing them.  She should put them on in the morning and take them off at night.  It will help her feel better, and help her heal.

## 2019-09-11 ENCOUNTER — TRANSCRIBE ORDERS (OUTPATIENT)
Dept: ADMINISTRATIVE | Facility: HOSPITAL | Age: 59
End: 2019-09-11

## 2019-09-11 DIAGNOSIS — Z12.31 VISIT FOR SCREENING MAMMOGRAM: Primary | ICD-10-CM

## 2019-09-23 ENCOUNTER — OFFICE VISIT (OUTPATIENT)
Dept: ORTHOPEDIC SURGERY | Facility: CLINIC | Age: 59
End: 2019-09-23

## 2019-09-23 VITALS — OXYGEN SATURATION: 98 % | HEIGHT: 63 IN | WEIGHT: 220 LBS | BODY MASS INDEX: 38.98 KG/M2 | HEART RATE: 76 BPM

## 2019-09-23 DIAGNOSIS — Z87.81 STATUS POST ORIF OF FRACTURE OF ANKLE: Primary | ICD-10-CM

## 2019-09-23 DIAGNOSIS — Z98.890 STATUS POST ORIF OF FRACTURE OF ANKLE: Primary | ICD-10-CM

## 2019-09-23 PROCEDURE — 99212 OFFICE O/P EST SF 10 MIN: CPT | Performed by: ORTHOPAEDIC SURGERY

## 2019-09-23 RX ORDER — IRBESARTAN 150 MG/1
TABLET ORAL
COMMUNITY

## 2019-09-23 RX ORDER — FLUCONAZOLE 150 MG/1
150 TABLET ORAL EVERY OTHER DAY
Refills: 0 | COMMUNITY
Start: 2019-06-18

## 2019-09-23 RX ORDER — CETIRIZINE HYDROCHLORIDE 10 MG/1
10 TABLET ORAL DAILY
Refills: 0 | COMMUNITY
Start: 2019-09-18

## 2019-09-23 RX ORDER — POLYMYXIN B SULFATE AND TRIMETHOPRIM 1; 10000 MG/ML; [USP'U]/ML
SOLUTION OPHTHALMIC
Refills: 0 | COMMUNITY
Start: 2019-08-30

## 2019-09-23 RX ORDER — PHENAZOPYRIDINE HYDROCHLORIDE 100 MG/1
TABLET, FILM COATED ORAL
Refills: 0 | COMMUNITY
Start: 2019-06-18

## 2019-09-23 RX ORDER — MECLIZINE HYDROCHLORIDE 25 MG/1
TABLET ORAL
Refills: 0 | COMMUNITY
Start: 2019-09-18

## 2019-09-23 NOTE — PROGRESS NOTES
"ESTABLISHED PATIENT    Patient: Angelica Stewart  : 1960    Primary Care Provider: Tacho Mendoza MD    Requesting Provider: As above    Follow-up (3 month follow up; status post ORIF left ankle fracture 01/15/19)      History    Chief Complaint: Follow-up left ankle fracture    History of Present Illness: She returns for follow-up of her left ankle fracture treated with ORIF 1/15/2019.  She reports the pain has essentially resolved.  She reports she is \"very pleased\" she has almost symmetric range of motion.  She is not wearing her support stockings very much right now because of the hot weather.  I encouraged her to wear them daily.    Current Outpatient Medications on File Prior to Visit   Medication Sig Dispense Refill   • cetirizine (zyrTEC) 10 MG tablet Take 10 mg by mouth Daily.  0   • docusate sodium (COLACE) 100 MG capsule Take 1 capsule by mouth 2 (Two) Times a Day. 60 capsule 0   • DULoxetine (CYMBALTA) 60 MG capsule Take 60 mg by mouth Daily.  1   • enoxaparin (LOVENOX) 40 MG/0.4ML solution syringe Inject 0.4 mL under the skin into the appropriate area as directed Daily. For 2 weeks 5.6 mL 0   • fluconazole (DIFLUCAN) 150 MG tablet Take 150 mg by mouth Every Other Day.  0   • gabapentin (NEURONTIN) 100 MG capsule Take 100 mg by mouth 3 (Three) Times a Day.  0   • irbesartan (AVAPRO) 150 MG tablet irbesartan 150 mg tablet   Take 1 tablet every day by oral route.     • lansoprazole (PREVACID) 30 MG capsule Take 30 mg by mouth 2 (Two) Times a Day.  1   • linaclotide (LINZESS) 72 MCG capsule capsule Take 72 mcg by mouth Every Night.     • meclizine (ANTIVERT) 25 MG tablet TAKE 1 TABLET BY MOUTH 4 TIMES A DAY AS NEEDED  0   • meloxicam (MOBIC) 15 MG tablet Take 15 mg by mouth Daily.  1   • metoprolol succinate XL (TOPROL-XL) 25 MG 24 hr tablet Take 50 mg by mouth Daily.  1   • ondansetron (ZOFRAN) 4 MG tablet Take 1 tablet by mouth Every 6 (Six) Hours As Needed for Nausea or Vomiting. 30 " "tablet 0   • phenazopyridine (PYRIDIUM) 100 MG tablet TAKE 1 CAPSULE BY MOUTH 3 TIMES A DAY AS NEEDED  0   • SITagliptin-MetFORMIN HCl (JANUMET PO) Take 1 tablet by mouth Daily.     • SUMAtriptan (IMITREX) 100 MG tablet Take 100 mg by mouth Daily As Needed for Migraine.  1   • trimethoprim-polymyxin b (POLYTRIM) 58735-1.1 UNIT/ML-% ophthalmic solution   0     No current facility-administered medications on file prior to visit.       Allergies   Allergen Reactions   • Penicillins Other (See Comments)     UNSURE REACTION, REPORTS WAS TOLD THIS WAS AN ALLERGY BY HER MOTHER.   • Celecoxib Rash     RASH       Past Medical History:   Diagnosis Date   • Acid reflux    • Anal fistula    • Arthritis    • Body piercing     EARS   • Fibromyalgia     PATIENT REPORTS \"IT'S SELF DIAGNOSED\"   • Full dentures     INSTRUCTED NO ADHESIVES DOS   • Hypertension    • Migraines    • Migraines    • PONV (postoperative nausea and vomiting)    • Rectocele    • Wears contact lenses      Past Surgical History:   Procedure Laterality Date   • ANKLE OPEN REDUCTION INTERNAL FIXATION Left 1/15/2019    Procedure: ORIF LEFT ANKLE FRACTURE;  Surgeon: Sherry Faust MD;  [facility]  Service[facility]ics   • BREAST BIOPSY     • BREAST SURGERY Bilateral     REPORTS BILATERAL BIOPSIES WITH TUMOR MARKER PLACEMENT   • COLONOSCOPY N/A 9/25/2017    Procedure: COLONOSCOPY;  Surgeon: Manisha Potter MD;  Location: [facility] ENDOSCOPY;  Service:    • ENDOSCOPY     • MOUTH SURGERY      REPORTS FULL MOUTH EXTRACTION AT AGE 14   • OTHER SURGICAL HISTORY  2007    REPORTS CYSTS REMOVED FROM THROAT     Family History   Problem Relation Age of Onset   • Breast cancer Mother 80   • Cancer Mother    • Collagen disease Mother    • Hypertension Mother    • Cancer Father         colon   • Heart attack Father    • Ovarian cancer Neg Hx       Social History     Socioeconomic History   • Marital status:      Spouse name: Not on file   • Number of children: " "Not on file   • Years of education: Not on file   • Highest education level: Not on file   Tobacco Use   • Smoking status: Never Smoker   • Smokeless tobacco: Never Used   Substance and Sexual Activity   • Alcohol use: No   • Drug use: No   • Sexual activity: Defer     Comment:         Review of Systems   Constitutional: Negative.    HENT: Negative.    Eyes: Negative.    Respiratory: Negative.    Cardiovascular: Negative.    Gastrointestinal: Negative.    Endocrine: Negative.    Genitourinary: Negative.    Musculoskeletal: Positive for arthralgias.   Skin: Negative.    Allergic/Immunologic: Positive for environmental allergies.   Neurological: Negative.    Hematological: Negative.    Psychiatric/Behavioral: Negative.        The following portions of the patient's history were reviewed and updated as appropriate: allergies, current medications, past family history, past medical history, past social history, past surgical history and problem list.    Physical Exam:   Pulse 76   Ht 160 cm (62.99\")   Wt 99.8 kg (220 lb)   LMP  (LMP Unknown) Comment:  FOR 7YRS  SpO2 98%   BMI 38.98 kg/m²   GENERAL: Body habitus: obese    Lower extremity edema: Left: trace; Right: trace    Gait: normal     Mental Status:  awake and alert; oriented to person, place, and time  MSK:  Tibia:  Right:  non tender; Left:  non tender        Ankle:  Right: non tender; Left:  No tenderness in the left ankle except slightly over the medial screw, 10 degrees dorsiflexion, 30 degrees plantarflexion, 10 degrees inversion eversion, incisions are healed, this is almost fully symmetric with the right        Foot:  Right:  non tender; Left:  non tender    NEURO Sensation:  intact    Medical Decision Making    Data Review:   ordered and reviewed x-rays today    Assessment/Plan/Diagnosis/Treatment Options:   1. Status post ORIF of fracture of ankle  Her pain and range of motion have improved.  Continue to work on it.  Continue to wear support " stockings.  I will see her in 6 months with standing 3 views of the ankle  - XR Ankle 3+ View Left

## 2019-11-06 ENCOUNTER — APPOINTMENT (OUTPATIENT)
Dept: MAMMOGRAPHY | Facility: HOSPITAL | Age: 59
End: 2019-11-06

## 2020-03-25 ENCOUNTER — TELEPHONE (OUTPATIENT)
Dept: ORTHOPEDIC SURGERY | Facility: CLINIC | Age: 60
End: 2020-03-25

## 2020-03-25 NOTE — TELEPHONE ENCOUNTER
I called her, she is doing well, she will call to reschedule.  Ankle is fine, some trouble with glucose control.  3/25/2020, 1:36pm

## 2020-07-08 ENCOUNTER — OFFICE VISIT (OUTPATIENT)
Dept: ORTHOPEDIC SURGERY | Facility: CLINIC | Age: 60
End: 2020-07-08

## 2020-07-08 DIAGNOSIS — Z87.81 STATUS POST ORIF OF FRACTURE OF ANKLE: Primary | ICD-10-CM

## 2020-07-08 DIAGNOSIS — E11.9 CONTROLLED TYPE 2 DIABETES MELLITUS WITHOUT COMPLICATION, WITHOUT LONG-TERM CURRENT USE OF INSULIN (HCC): ICD-10-CM

## 2020-07-08 DIAGNOSIS — Z98.890 STATUS POST ORIF OF FRACTURE OF ANKLE: Primary | ICD-10-CM

## 2020-07-08 PROCEDURE — 99212 OFFICE O/P EST SF 10 MIN: CPT | Performed by: ORTHOPAEDIC SURGERY

## 2020-07-08 NOTE — PROGRESS NOTES
ESTABLISHED PATIENT    Patient: Angelica Stewart  : 1960    Primary Care Provider: Tacho Mendoza MD    Requesting Provider: As above    Follow-up (9 month follow up; status post ORIF left ankle fracture 01/15/19)      History    Chief Complaint: Follow-up left ankle fracture    History of Present Illness: She is now 18 months status post ORIF left ankle fracture, 1/15/2019.  She has diabetes, she reports reasonable diabetic control although she has not checked her glucose in several weeks.  Her last A1c was under 7.  She reports occasional aching in the ankle.  She also has some leg cramps and hip pain.  She reports that she is gained weight during the pandemic.    Current Outpatient Medications on File Prior to Visit   Medication Sig Dispense Refill   • cetirizine (zyrTEC) 10 MG tablet Take 10 mg by mouth Daily.  0   • docusate sodium (COLACE) 100 MG capsule Take 1 capsule by mouth 2 (Two) Times a Day. 60 capsule 0   • DULoxetine (CYMBALTA) 60 MG capsule Take 60 mg by mouth Daily.  1   • fluconazole (DIFLUCAN) 150 MG tablet Take 150 mg by mouth Every Other Day.  0   • gabapentin (NEURONTIN) 100 MG capsule Take 100 mg by mouth 3 (Three) Times a Day.  0   • irbesartan (AVAPRO) 150 MG tablet irbesartan 150 mg tablet   Take 1 tablet every day by oral route.     • lansoprazole (PREVACID) 30 MG capsule Take 30 mg by mouth 2 (Two) Times a Day.  1   • linaclotide (LINZESS) 72 MCG capsule capsule Take 72 mcg by mouth Every Night.     • meclizine (ANTIVERT) 25 MG tablet TAKE 1 TABLET BY MOUTH 4 TIMES A DAY AS NEEDED  0   • meloxicam (MOBIC) 15 MG tablet Take 15 mg by mouth Daily.  1   • metoprolol succinate XL (TOPROL-XL) 25 MG 24 hr tablet Take 50 mg by mouth Daily.  1   • ondansetron (ZOFRAN) 4 MG tablet Take 1 tablet by mouth Every 6 (Six) Hours As Needed for Nausea or Vomiting. 30 tablet 0   • phenazopyridine (PYRIDIUM) 100 MG tablet TAKE 1 CAPSULE BY MOUTH 3 TIMES A DAY AS NEEDED  0   •  "SITagliptin-MetFORMIN HCl (JANUMET PO) Take 1 tablet by mouth Daily.     • SUMAtriptan (IMITREX) 100 MG tablet Take 100 mg by mouth Daily As Needed for Migraine.  1   • trimethoprim-polymyxin b (POLYTRIM) 80590-3.1 UNIT/ML-% ophthalmic solution   0     No current facility-administered medications on file prior to visit.       Allergies   Allergen Reactions   • Penicillins Other (See Comments)     UNSURE REACTION, REPORTS WAS TOLD THIS WAS AN ALLERGY BY HER MOTHER.   • Celecoxib Rash     RASH       Past Medical History:   Diagnosis Date   • Acid reflux    • Anal fistula    • Arthritis    • Body piercing     EARS   • Fibromyalgia     PATIENT REPORTS \"IT'S SELF DIAGNOSED\"   • Full dentures     INSTRUCTED NO ADHESIVES DOS   • Hypertension    • Migraines    • Migraines    • PONV (postoperative nausea and vomiting)    • Rectocele    • Wears contact lenses      Past Surgical History:   Procedure Laterality Date   • ANKLE OPEN REDUCTION INTERNAL FIXATION Left 1/15/2019    Procedure: ORIF LEFT ANKLE FRACTURE;  Surgeon: Sherry Faust MD;  Location: [facility] OR;  Service: Orthopedics   • BREAST BIOPSY     • BREAST SURGERY Bilateral     REPORTS BILATERAL BIOPSIES WITH TUMOR MARKER PLACEMENT   • COLONOSCOPY N/A 9/25/2017    Procedure: COLONOSCOPY;  Surgeon: Manisha Potter MD;  Location: [facility] ENDOSCOPY;  Service:    • ENDOSCOPY     • MOUTH SURGERY      REPORTS FULL MOUTH EXTRACTION AT AGE 14   • OTHER SURGICAL HISTORY  2007    REPORTS CYSTS REMOVED FROM THROAT     Family History   Problem Relation Age of Onset   • Breast cancer Mother 80   • Cancer Mother    • Collagen disease Mother    • Hypertension Mother    • Cancer Father         colon   • Heart attack Father    • Ovarian cancer Neg Hx       Social History     Socioeconomic History   • Marital status:      Spouse name: Not on file   • Number of children: Not on file   • Years of education: Not on file   • Highest education level: Not on file   Tobacco Use   • " Smoking status: Never Smoker   • Smokeless tobacco: Never Used   Substance and Sexual Activity   • Alcohol use: No   • Drug use: No   • Sexual activity: Defer     Comment:         Review of Systems   Constitutional: Negative.    HENT: Negative.    Eyes: Negative.    Respiratory: Negative.    Cardiovascular: Negative.    Gastrointestinal: Negative.    Endocrine: Negative.    Genitourinary: Negative.    Musculoskeletal: Positive for arthralgias.   Skin: Negative.    Allergic/Immunologic: Negative.    Neurological: Negative.    Hematological: Negative.    Psychiatric/Behavioral: Negative.        The following portions of the patient's history were reviewed and updated as appropriate: allergies, current medications, past family history, past medical history, past social history, past surgical history and problem list.    Physical Exam:   LMP  (LMP Unknown) Comment:  FOR 7YRS  GENERAL: Body habitus: obese    Lower extremity edema: Left: none; Right: none    Gait: normal     Mental Status:  awake and alert; oriented to person, place, and time  MSK:  Tibia:  Right:  non tender; Left:  non tender        Ankle:  Right: non tender; Left:  non tender today, range of motion symmetric with the right, incisions healed        Foot:  Right:  non tender; Left:  non tender    NEURO Sensation:  intact    Medical Decision Making    Data Review:   ordered and reviewed x-rays today    Assessment/Plan/Diagnosis/Treatment Options:   1. Status post ORIF of fracture of ankle  She has slight decrease in cartilage in the dorsal lateral ankle joint, consistent with the severe trauma.  Overall the cartilage space is still very reasonable.  We discussed weight loss.  We discussed glucose control.  I will see her in a year with standing 2 views of the ankle  - XR Ankle 3+ View Left    2.  She reports reasonable glucose control

## 2021-02-25 DIAGNOSIS — Z23 IMMUNIZATION DUE: ICD-10-CM

## 2021-07-07 ENCOUNTER — OFFICE VISIT (OUTPATIENT)
Dept: ORTHOPEDIC SURGERY | Facility: CLINIC | Age: 61
End: 2021-07-07

## 2021-07-07 VITALS
SYSTOLIC BLOOD PRESSURE: 186 MMHG | DIASTOLIC BLOOD PRESSURE: 91 MMHG | BODY MASS INDEX: 40.75 KG/M2 | HEIGHT: 63 IN | HEART RATE: 77 BPM | WEIGHT: 230 LBS

## 2021-07-07 DIAGNOSIS — Z98.890 STATUS POST ORIF OF FRACTURE OF ANKLE: Primary | ICD-10-CM

## 2021-07-07 DIAGNOSIS — Z87.81 STATUS POST ORIF OF FRACTURE OF ANKLE: Primary | ICD-10-CM

## 2021-07-07 PROCEDURE — 99213 OFFICE O/P EST LOW 20 MIN: CPT | Performed by: ORTHOPAEDIC SURGERY

## 2021-07-07 RX ORDER — LANCETS 33 GAUGE
EACH MISCELLANEOUS
COMMUNITY

## 2021-07-07 RX ORDER — HYDROXYZINE PAMOATE 25 MG/1
CAPSULE ORAL
COMMUNITY

## 2021-07-07 NOTE — PROGRESS NOTES
ESTABLISHED PATIENT    Patient: Angelica Stewart  : 1960    Primary Care Provider: Tacho Mendoza MD    Requesting Provider: As above    Follow-up (1 year follow up - Status post ORIF of fracture of ankle 01/15/2019)      History    Chief Complaint: Follow-up left ankle fracture    History of Present Illness: She is here for follow-up left ankle fracture, treated with open reduction internal fixation 1/15/2019.  She reports no problems.  She is also diabetic, she does not know her most recent hemoglobin A1c.  She reports she rarely checks her glucose.  She reports that she is very happy with the ankle results.  She has noted that the screw heads are palpable but they do not hurt.  I reassured her this is normal, there is no need to remove hardware unless it causes a problem    Current Outpatient Medications on File Prior to Visit   Medication Sig Dispense Refill   • cetirizine (zyrTEC) 10 MG tablet Take 10 mg by mouth Daily.  0   • docusate sodium (COLACE) 100 MG capsule Take 1 capsule by mouth 2 (Two) Times a Day. 60 capsule 0   • DULoxetine (CYMBALTA) 60 MG capsule Take 60 mg by mouth Daily.  1   • fluconazole (DIFLUCAN) 150 MG tablet Take 150 mg by mouth Every Other Day.  0   • gabapentin (NEURONTIN) 100 MG capsule Take 100 mg by mouth 3 (Three) Times a Day.  0   • glucose blood test strip OneTouch Verio test strips   CHECK BLOOD SUGARS DAILY AND AS NEEDED ON SICK DAYS AS DIRECTED     • glucose blood test strip OneTouch Verio Meter   USE AS DIRECTED     • hydrOXYzine pamoate (VISTARIL) 25 MG capsule hydroxyzine pamoate 25 mg capsule   take 1 po BID prn     • irbesartan (AVAPRO) 150 MG tablet irbesartan 150 mg tablet   Take 1 tablet every day by oral route.     • Lancets (OneTouch Delica Plus Fdtfju33E) misc OneTouch Delica Plus Lancet 33 gauge   USE DAILY AND AS NEEDED ON SICK DAYS AS DIRECTED     • lansoprazole (PREVACID) 30 MG capsule Take 30 mg by mouth 2 (Two) Times a Day.  1   •  "linaclotide (LINZESS) 72 MCG capsule capsule Take 72 mcg by mouth Every Night.     • meclizine (ANTIVERT) 25 MG tablet TAKE 1 TABLET BY MOUTH 4 TIMES A DAY AS NEEDED  0   • meloxicam (MOBIC) 15 MG tablet Take 15 mg by mouth Daily.  1   • metoprolol succinate XL (TOPROL-XL) 25 MG 24 hr tablet Take 50 mg by mouth Daily.  1   • ondansetron (ZOFRAN) 4 MG tablet Take 1 tablet by mouth Every 6 (Six) Hours As Needed for Nausea or Vomiting. 30 tablet 0   • phenazopyridine (PYRIDIUM) 100 MG tablet TAKE 1 CAPSULE BY MOUTH 3 TIMES A DAY AS NEEDED  0   • SITagliptin-MetFORMIN HCl (JANUMET PO) Take 1 tablet by mouth Daily.     • SUMAtriptan (IMITREX) 100 MG tablet Take 100 mg by mouth Daily As Needed for Migraine.  1   • trimethoprim-polymyxin b (POLYTRIM) 37432-2.1 UNIT/ML-% ophthalmic solution   0     No current facility-administered medications on file prior to visit.      Allergies   Allergen Reactions   • Penicillins Other (See Comments)     UNSURE REACTION, REPORTS WAS TOLD THIS WAS AN ALLERGY BY HER MOTHER.   • Celecoxib Rash     RASH       Past Medical History:   Diagnosis Date   • Acid reflux    • Anal fistula    • Arthritis    • Body piercing     EARS   • Fibromyalgia     PATIENT REPORTS \"IT'S SELF DIAGNOSED\"   • Full dentures     INSTRUCTED NO ADHESIVES DOS   • Hypertension    • Migraines    • Migraines    • PONV (postoperative nausea and vomiting)    • Rectocele    • Wears contact lenses      Past Surgical History:   Procedure Laterality Date   • ANKLE OPEN REDUCTION INTERNAL FIXATION Left 1/15/2019    Procedure: ORIF LEFT ANKLE FRACTURE;  Surgeon: Sherry Faust MD;  Location: [facility] OR;  Service: Orthopedics   • BREAST BIOPSY     • BREAST SURGERY Bilateral     REPORTS BILATERAL BIOPSIES WITH TUMOR MARKER PLACEMENT   • COLONOSCOPY N/A 9/25/2017    Procedure: COLONOSCOPY;  Surgeon: Manisha Potter MD;  Location: [facility] ENDOSCOPY;  Service:    • ENDOSCOPY     • MOUTH SURGERY      REPORTS FULL MOUTH EXTRACTION AT AGE " "14   • OTHER SURGICAL HISTORY  2007    REPORTS CYSTS REMOVED FROM THROAT     Family History   Problem Relation Age of Onset   • Breast cancer Mother 80   • Cancer Mother    • Collagen disease Mother    • Hypertension Mother    • Cancer Father         colon   • Heart attack Father    • Ovarian cancer Neg Hx       Social History     Socioeconomic History   • Marital status:      Spouse name: Not on file   • Number of children: Not on file   • Years of education: Not on file   • Highest education level: Not on file   Tobacco Use   • Smoking status: Never Smoker   • Smokeless tobacco: Never Used   Substance and Sexual Activity   • Alcohol use: No   • Drug use: No   • Sexual activity: Defer     Comment:         Review of Systems   Constitutional: Negative.    HENT: Negative.    Eyes: Negative.    Respiratory: Negative.    Cardiovascular: Negative.    Gastrointestinal: Negative.    Endocrine: Negative.    Genitourinary: Negative.    Musculoskeletal: Positive for arthralgias.   Skin: Negative.    Allergic/Immunologic: Negative.    Neurological: Negative.    Hematological: Negative.    Psychiatric/Behavioral: Negative.        The following portions of the patient's history were reviewed and updated as appropriate: allergies, current medications, past family history, past medical history, past social history, past surgical history and problem list.    Physical Exam:   BP (!) 186/91   Pulse 77   Ht 160 cm (62.99\")   Wt 104 kg (230 lb)   LMP  (LMP Unknown) Comment:  FOR 7YRS  BMI 40.75 kg/m²   GENERAL: Body habitus: trunkal obesity    Lower extremity edema: Left: none; Right: none    Gait: normal     Mental Status:  awake and alert; oriented to person, place, and time  MSK:  Tibia:  Right:  non tender; Left:  non tender        Ankle:  Right: non tender; Left:  No tenderness, range of motion is normal and symmetric with the right, lateral hardware is palpable but not tender        Foot:  Right:  non " tender; Left:  non tender    NEURO Sensation:  intact    Medical Decision Making    Data Review:   ordered and reviewed x-rays today    Assessment/Plan/Diagnosis/Treatment Options:   1. Status post ORIF of fracture of ankle  She has done very well.  She has excellent range of motion.  I cautioned her about her diabetes, she should know her A1c and work to keep it under 7 to help decrease the risk of complications.  I will be happy to see her anytime  - XR Ankle 3+ View Left        Sherry Faust MD

## 2025-07-28 NOTE — TELEPHONE ENCOUNTER
I spoke with the patient's  and advised him that per Dr. Hong they can go ahead and unwrap the splint but not completely but if she needs more then that, then she needs to come in. The patient stated that she feels like the inside of the splint is the portion of the splint that has slid down. She also feels as if she is getting a sore on her incision site and it is rubbing her the wrong way which is making her uncomfortable. I spoke with lidia to see if we could see her tomorrow morning possibly, and she wants the patient to be here by 8:30 just in case we need to have LTD or TAR look at it. I advised the patient of this and she stated that she is just going to have her  try and re-wrap it for her.     Patient is supposed to be calling back to let us know whether or not she is able to come in or not.    Layla    list reviewed by Cira Agustin 

## (undated) DEVICE — UNDERCAST PADDING: Brand: DEROYAL

## (undated) DEVICE — Device

## (undated) DEVICE — PK EXTREM LOWR 10

## (undated) DEVICE — GLV SURG SIGNATURE TOUCH PF LTX 7 STRL

## (undated) DEVICE — 3M™ WARMING BLANKET, UPPER BODY, 10 PER CASE, 42268: Brand: BAIR HUGGER™

## (undated) DEVICE — BIT DRL QC DIA 2.5X110MM

## (undated) DEVICE — GLV SURG RAD SENSICARE SHLD PF LF SZ8 STRL

## (undated) DEVICE — PAD ARMBRD SURG CONVOL 7.5X20X2IN

## (undated) DEVICE — DELFI MATCHING LIMB PROTECTION SLEEVES (MLPS) HELP PROTECT THE PATIENT’S LIMB FROM POSSIBLE WRINKLING, PINCHING AND SHEARING OF SKIN AND SOFT TISSUES OF THE LIMB.EACH DELFI MATCHING LIMB PROTECTION SLEEVE IS INTENDED FOR USE WITH A SPECIFIC DELFI TOURNIQUET CUFF. THIS SLEEVE IS SPECIFICALLY FOR THIGH.: Brand: MATCHING LIMB PROTECTION SLEEVES - VARI-FIT

## (undated) DEVICE — 3M™ IOBAN™ 2 ANTIMICROBIAL INCISE DRAPE 6650EZ: Brand: IOBAN™ 2

## (undated) DEVICE — INTENDED FOR TISSUE SEPARATION, AND OTHER PROCEDURES THAT REQUIRE A SHARP SURGICAL BLADE TO PUNCTURE OR CUT.: Brand: BARD-PARKER ® SAFETYLOCK CARBON RIB-BACK BLADES

## (undated) DEVICE — SUT MONOCRYL PLS ANTIB UND 3/0  PS1 27IN

## (undated) DEVICE — CVR HNDL LT SURG ACCSSRY BLU STRL

## (undated) DEVICE — MEDI-VAC NON-CONDUCTIVE SUCTION TUBING: Brand: CARDINAL HEALTH

## (undated) DEVICE — SYR LUER SLPTP 50ML

## (undated) DEVICE — PAD GRND REM POLYHESIVE A/ DISP

## (undated) DEVICE — SPNG GZ STRL 2S 4X4 12PLY

## (undated) DEVICE — UNDERGLV SURG BIOGEL INDICAT PF 61/2 GRN

## (undated) DEVICE — ENDOGATOR AUXILIARY WATER JET CONNECTOR: Brand: ENDOGATOR

## (undated) DEVICE — PUMP PAIN AUTOFUSER AUTO SELCT NOBOLUS 1TO14ML/HR 550ML DISP

## (undated) DEVICE — DRAPE,REIN 53X77,STERILE: Brand: MEDLINE

## (undated) DEVICE — SPLNT ORTHOGLASS UNPAD P/C 4X38IN

## (undated) DEVICE — SUT MNCRYL 2/0 SH 27IN UD MCP417H

## (undated) DEVICE — BNDG ELAS W/CLIP 6IN 10YD LF STRL

## (undated) DEVICE — JELLY,LUBE,STERILE,FLIP TOP,TUBE,2-OZ: Brand: MEDLINE

## (undated) DEVICE — BIT DRL QC DIA 3.5X110MM

## (undated) DEVICE — GW THRD 1.25X150MM FOR 3.5 4MM CANN SCRW

## (undated) DEVICE — SUCTION CANISTER, 2500CC, RIGID: Brand: DEROYAL

## (undated) DEVICE — SPNG GZ WOVN 4X4IN 12PLY 10/BX STRL

## (undated) DEVICE — DRSNG TELFA PAD NONADH STR 1S 3X8IN

## (undated) DEVICE — GLV SURG SENSICARE W/ALOE PF LF 7.5 STRL

## (undated) DEVICE — PAD CAST SOF ROL NS 6IN

## (undated) DEVICE — SPLNT ORTHOGLASS UNPAD P/C 4X24IN

## (undated) DEVICE — 1010 S-DRAPE TOWEL DRAPE 10/BX: Brand: STERI-DRAPE™

## (undated) DEVICE — APPL CHLORAPREP W/TINT 26ML BLU

## (undated) DEVICE — SNAP KOVER: Brand: UNBRANDED